# Patient Record
Sex: FEMALE | Race: WHITE | Employment: FULL TIME | URBAN - METROPOLITAN AREA
[De-identification: names, ages, dates, MRNs, and addresses within clinical notes are randomized per-mention and may not be internally consistent; named-entity substitution may affect disease eponyms.]

---

## 2017-12-01 PROBLEM — T50.911A MULTIPLE DRUG OVERDOSE: Status: ACTIVE | Noted: 2017-12-01

## 2017-12-02 PROBLEM — R40.1 OBTUNDATION: Status: ACTIVE | Noted: 2017-12-02

## 2017-12-02 PROBLEM — E16.2 HYPOGLYCEMIA: Status: ACTIVE | Noted: 2017-12-02

## 2017-12-02 PROBLEM — R57.9 SHOCK (HCC): Status: ACTIVE | Noted: 2017-12-02

## 2017-12-02 PROBLEM — E83.51 HYPOCALCEMIA: Status: ACTIVE | Noted: 2017-12-02

## 2017-12-02 PROBLEM — F12.90 MARIJUANA SMOKER: Chronic | Status: ACTIVE | Noted: 2017-12-02

## 2017-12-02 PROBLEM — E83.39 HYPOPHOSPHATEMIA: Status: ACTIVE | Noted: 2017-12-02

## 2017-12-02 PROBLEM — E87.20 LACTIC ACIDOSIS: Status: ACTIVE | Noted: 2017-12-02

## 2017-12-02 PROBLEM — R94.31 PROLONGED Q-T INTERVAL ON ECG: Status: ACTIVE | Noted: 2017-12-02

## 2017-12-02 PROBLEM — Z72.0 TOBACCO ABUSE: Chronic | Status: ACTIVE | Noted: 2017-12-02

## 2017-12-02 PROBLEM — Z87.890: Chronic | Status: ACTIVE | Noted: 2017-12-02

## 2017-12-02 PROBLEM — R94.31 T WAVE INVERSION IN EKG: Status: ACTIVE | Noted: 2017-12-02

## 2017-12-02 PROBLEM — E72.20 HYPERAMMONEMIA (HCC): Status: ACTIVE | Noted: 2017-12-02

## 2017-12-02 PROBLEM — T14.91XA SUICIDE ATTEMPT (HCC): Status: ACTIVE | Noted: 2017-12-02

## 2017-12-03 PROBLEM — E44.0 MODERATE PROTEIN-CALORIE MALNUTRITION (HCC): Status: ACTIVE | Noted: 2017-12-03

## 2017-12-05 ENCOUNTER — CASE MANAGEMENT (OUTPATIENT)
Dept: EMERGENCY DEPT | Age: 27
End: 2017-12-05

## 2017-12-06 PROBLEM — M62.82 NON-TRAUMATIC RHABDOMYOLYSIS: Status: ACTIVE | Noted: 2017-12-06

## 2017-12-07 PROBLEM — E83.51 HYPOCALCEMIA: Status: RESOLVED | Noted: 2017-12-02 | Resolved: 2017-12-07

## 2017-12-07 PROBLEM — E83.39 HYPOPHOSPHATEMIA: Status: RESOLVED | Noted: 2017-12-02 | Resolved: 2017-12-07

## 2017-12-07 PROBLEM — E16.2 HYPOGLYCEMIA: Status: RESOLVED | Noted: 2017-12-02 | Resolved: 2017-12-07

## 2017-12-07 PROBLEM — R57.9 SHOCK (HCC): Status: RESOLVED | Noted: 2017-12-02 | Resolved: 2017-12-07

## 2017-12-07 PROBLEM — R94.31 T WAVE INVERSION IN EKG: Status: RESOLVED | Noted: 2017-12-02 | Resolved: 2017-12-07

## 2017-12-07 PROBLEM — E87.20 LACTIC ACIDOSIS: Status: RESOLVED | Noted: 2017-12-02 | Resolved: 2017-12-07

## 2017-12-07 PROBLEM — F33.2 SEVERE EPISODE OF RECURRENT MAJOR DEPRESSIVE DISORDER, WITHOUT PSYCHOTIC FEATURES (HCC): Status: ACTIVE | Noted: 2017-12-07

## 2017-12-07 PROBLEM — M62.82 NON-TRAUMATIC RHABDOMYOLYSIS: Status: RESOLVED | Noted: 2017-12-06 | Resolved: 2017-12-07

## 2017-12-07 PROBLEM — E72.20 HYPERAMMONEMIA (HCC): Status: RESOLVED | Noted: 2017-12-02 | Resolved: 2017-12-07

## 2017-12-07 PROBLEM — T50.911A MULTIPLE DRUG OVERDOSE: Status: RESOLVED | Noted: 2017-12-01 | Resolved: 2017-12-07

## 2017-12-08 PROBLEM — R40.1 OBTUNDATION: Status: RESOLVED | Noted: 2017-12-02 | Resolved: 2017-12-08

## 2017-12-08 PROBLEM — F60.3 BORDERLINE PERSONALITY DISORDER (HCC): Chronic | Status: ACTIVE | Noted: 2017-12-08

## 2017-12-08 PROBLEM — T14.91XA SUICIDE ATTEMPT (HCC): Status: RESOLVED | Noted: 2017-12-02 | Resolved: 2017-12-08

## 2017-12-08 PROBLEM — Z72.0 TOBACCO ABUSE: Chronic | Status: RESOLVED | Noted: 2017-12-02 | Resolved: 2017-12-08

## 2017-12-08 PROBLEM — F33.2 SEVERE EPISODE OF RECURRENT MAJOR DEPRESSIVE DISORDER, WITHOUT PSYCHOTIC FEATURES (HCC): Status: RESOLVED | Noted: 2017-12-07 | Resolved: 2017-12-08

## 2017-12-08 PROBLEM — R94.31 PROLONGED Q-T INTERVAL ON ECG: Status: RESOLVED | Noted: 2017-12-02 | Resolved: 2017-12-08

## 2017-12-08 PROBLEM — E44.0 MODERATE PROTEIN-CALORIE MALNUTRITION (HCC): Status: RESOLVED | Noted: 2017-12-03 | Resolved: 2017-12-08

## 2017-12-14 ENCOUNTER — HOSPITAL ENCOUNTER (OUTPATIENT)
Dept: PSYCHIATRY | Age: 27
Discharge: HOME | End: 2017-12-31
Attending: PSYCHIATRY & NEUROLOGY | Admitting: PSYCHIATRY & NEUROLOGY

## 2017-12-14 PROCEDURE — 99232 SBSQ HOSP IP/OBS MODERATE 35: CPT | Performed by: PHYSICIAN ASSISTANT

## 2017-12-14 ASSESSMENT — ENCOUNTER SYMPTOMS
DOUBLE VISION: 0
DIARRHEA: 0
VOMITING: 0
ABDOMINAL PAIN: 0
NAUSEA: 0
BLURRED VISION: 0
CONSTIPATION: 0
SHORTNESS OF BREATH: 0

## 2017-12-14 NOTE — PLAN OF CARE
Problem: Altered Mood, Depressive Behavior  Goal: STG-Knowledge of positive coping patterns  Outcome: Ongoing                                                                      Group Therapy Note    Date: 12/14/2017  Start Time: 10:00am  End Time: 11:00am  Number of Participants: 8    Type of Group: Psychoeducation    Psychoeducation/ Recreation Therapy    Patient's Goal:  To use \"role play\" cards to practice assertiveness. Notes:  Pt engaged in group discussion. Pt was appropriate. Pt was able to increase assertiveness skills through this activity. Status After Intervention:  Improved    Participation Level:  Active Listener and Interactive    Participation Quality: Appropriate, Attentive, Sharing and Supportive      Speech:  normal      Thought Process/Content: Logical      Affective Functioning: Flat      Mood: depressed      Level of consciousness:  Alert and Oriented x4      Response to Learning: Able to retain information and Capable of insight      Endings: None Reported    Modes of Intervention: Education, Support, Socialization and Activity      Discipline Responsible: Psychoeducational Specialist      Signature:  Peter Leach

## 2017-12-14 NOTE — PROGRESS NOTES
Patient arrived for her intake appointment and first day of outpatient treatment. She states that she was discharged from the [facility] inpatient unit two days ago. She states that since discharge that she has been \" doing good\". She states that she was admitted to the inpatient unit do to \" a suicide attempt\". She states that she had been \"struggling since may with bipolar\". She states \" It was more of a psychosis thing , then a depressed thing\". She rates her depression level at a 4 out of ten. She states that she is glad to be back at her apartment. She denies SI/HI , AVH. She states \" I still have the intrusive thoughts that have like been slowly creeping back in\". She describes these thoughts as \" paranoid\" . \" like the world is out to get me type of thing\". She will begin PHP today 12/14/2017.

## 2017-12-15 ENCOUNTER — HOSPITAL ENCOUNTER (OUTPATIENT)
Dept: PSYCHIATRY | Age: 27
Discharge: HOME | End: 2017-12-15
Admitting: PSYCHIATRY & NEUROLOGY

## 2017-12-15 NOTE — BH NOTE
Group Therapy Note    Date: 12/15/2017  Start Time: 8:30  End Time:  9:45  Number of Participants: 8    Type of Group: Psychotherapy    Patient's Goal:  Pt will improve interpersonal interactions through group processing and agenda setting. Notes:  Pt participated in group discussion, provided supportive feedback toward others, and addressed her agenda within the group. Status After Intervention:  Unchanged    Participation Level:  Active Listener and Minimal    Participation Quality: Appropriate      Speech:  normal      Thought Process/Content: Logical      Affective Functioning: Flat      Mood: dysphoric      Level of consciousness:  Alert, Oriented x4 and Attentive      Response to Learning: Able to verbalize current knowledge/experience and Progressing to goal      Endings: None Reported    Modes of Intervention: Education, Support, Socialization, Exploration, Clarifying and Problem-solving      Discipline Responsible: /Counselor      Signature:  Kain Abebe

## 2017-12-15 NOTE — PLAN OF CARE
Problem: Altered Mood, Depressive Behavior  Goal: LTG-Able to verbalize acceptance of life and situations over which he or she has no control                                                                      Group Therapy Note    Date: 12/15/2017  Start Time: 11:15  End Time:  12:15  Number of Participants: 6    Type of Group: Relapse Prevention    Wellness Binder Information  Module Name: Reducing relapse  Session Number:  Tab 14    Patient's Goal:  Pt will identify common triggers and develop strategies to apply if triggered. Notes:  PT participated in group discussion, was able to relate to group topic, and remained engaged for the entire duration of group. Status After Intervention:  Unchanged    Participation Level:  Active Listener and Interactive    Participation Quality: Appropriate, Attentive, Sharing and Supportive      Speech:  normal      Thought Process/Content: Logical      Affective Functioning: Flat      Mood: dysphoric      Level of consciousness:  Alert, Oriented x4 and Attentive      Response to Learning: Able to verbalize current knowledge/experience and Progressing to goal      Endings: None Reported    Modes of Intervention: Education, Support, Socialization, Exploration, Clarifying and Problem-solving      Discipline Responsible: /Counselor      Signature:  Shanda Saint

## 2017-12-18 ENCOUNTER — HOSPITAL ENCOUNTER (OUTPATIENT)
Dept: PSYCHIATRY | Age: 27
Discharge: HOME | End: 2017-12-18
Admitting: PSYCHIATRY & NEUROLOGY

## 2017-12-18 VITALS — SYSTOLIC BLOOD PRESSURE: 102 MMHG | DIASTOLIC BLOOD PRESSURE: 66 MMHG | HEART RATE: 95 BPM

## 2017-12-18 NOTE — PLAN OF CARE
Problem: Altered Mood, Depressive Behavior  Goal: LTG-Able to verbalize acceptance of life and situations over which he or she has no control                                                                      Group Therapy Note    Date: 12/18/2017  Start Time: 8:30  End Time:  9:45  Number of Participants: 9    Type of Group: Psychotherapy      Patient's Goal:  Pt will improve interpersonal interactions through group processing and agenda setting. Notes:  Pt participated in group discussion, provided supportive feedback toward others, and addressed her agenda within the group. Status After Intervention:  Unchanged    Participation Level:  Active Listener and Minimal    Participation Quality: Appropriate, Attentive and Supportive      Speech:  normal      Thought Process/Content: Logical      Affective Functioning: Flat      Mood: dysphoric      Level of consciousness:  Alert, Oriented x4 and Attentive      Response to Learning: Able to verbalize current knowledge/experience and Progressing to goal      Endings: None Reported    Modes of Intervention: Education, Support, Socialization, Exploration, Clarifying and Problem-solving      Discipline Responsible: /Counselor      Signature:  Kush Burgess

## 2017-12-20 ENCOUNTER — HOSPITAL ENCOUNTER (OUTPATIENT)
Dept: PSYCHIATRY | Age: 27
Discharge: HOME | End: 2017-12-20
Admitting: PSYCHIATRY & NEUROLOGY

## 2017-12-20 VITALS — DIASTOLIC BLOOD PRESSURE: 65 MMHG | SYSTOLIC BLOOD PRESSURE: 98 MMHG | HEART RATE: 86 BPM

## 2017-12-20 NOTE — PLAN OF CARE
Problem: Altered Mood, Depressive Behavior  Goal: LTG-Able to verbalize acceptance of life and situations over which he or she has no control                                                                      Group Therapy Note    Date: 12/20/2017  Start Time: 8:30  End Time:  9:45  Number of Participants: 8    Type of Group: Psychotherapy    Patient's Goal:  Pt will improve interpersonal interactions through group processing agenda setting. Notes:  PT participated in group discussion, provided supportive feedback toward others, and addressed her agenda within the group. Status After Intervention:  Unchanged    Participation Level:  Active Listener and Interactive    Participation Quality: Appropriate, Attentive, Sharing and Supportive      Speech:  normal      Thought Process/Content: Logical      Affective Functioning: Flat      Mood: dysphoric      Level of consciousness:  Alert, Oriented x4 and Attentive      Response to Learning: Able to verbalize current knowledge/experience and Progressing to goal      Endings: None Reported    Modes of Intervention: Education, Support, Socialization, Exploration, Clarifying and Problem-solving      Discipline Responsible: /Counselor      Signature:  Rony German

## 2017-12-20 NOTE — BH NOTE
The patient is currently in [facility] and will step down to IOP treatment on December 28th. The patient continues to report VH. She does not appear to be responding to internal stimuli in groups. She is guarded but does participate in treatment.     Hossein Suarez PA-C

## 2017-12-20 NOTE — PLAN OF CARE
Problem: Altered Mood, Depressive Behavior  Goal: STG-Knowledge of positive coping patterns  Outcome: Ongoing                                                                      Group Therapy Note    Date: 12/20/2017  Start Time: 1:15 pm  End Time:  2:30 pm  Number of Participants: 5    Type of Group: Psychoeducation    Patient's Goal:  Patients were invited to participate in an open art studio, allowing patients to explore their current emotional state and giving them freedom of choice to determine their own art material and subject matter, encouraging independent decision making and problem solving. Patients were then invited to process their work with the group. Notes:  Santino actively engaged in art making, selecting to work with graphite pencils and paper to draw a detailed image of a girl with flowers in her hair. Santino did not engage in group conversation unless prompted and threw her work away at the end of session, selecting not to share with peers. Status After Intervention:  Unchanged    Participation Level:  Active Listener    Participation Quality: Appropriate and Attentive      Speech:  hesitant      Thought Process/Content: Logical      Affective Functioning: Flat      Mood: depressed      Level of consciousness:  Alert, Oriented x4 and Attentive      Response to Learning: Able to verbalize current knowledge/experience, Able to verbalize/acknowledge new learning, Able to retain information and Capable of insight      Endings: None Reported    Modes of Intervention: Education, Support, Socialization, Exploration and Problem-solving      Discipline Responsible: Psychoeducational Specialist      Signature:  Cuauhtemoc Marin

## 2017-12-21 ENCOUNTER — HOSPITAL ENCOUNTER (OUTPATIENT)
Dept: PSYCHIATRY | Age: 27
Discharge: HOME | End: 2017-12-21
Admitting: PSYCHIATRY & NEUROLOGY

## 2017-12-21 DIAGNOSIS — F60.3 BORDERLINE PERSONALITY DISORDER (HCC): Chronic | ICD-10-CM

## 2017-12-21 DIAGNOSIS — F33.9 MDD (MAJOR DEPRESSIVE DISORDER), RECURRENT EPISODE, WITH ATYPICAL FEATURES (HCC): Primary | Chronic | ICD-10-CM

## 2017-12-21 DIAGNOSIS — Z87.890 TRANSGENDER, S/P SEX REASSIGNMENT SURGERY: Chronic | ICD-10-CM

## 2017-12-21 PROCEDURE — 99232 SBSQ HOSP IP/OBS MODERATE 35: CPT | Performed by: PHYSICIAN ASSISTANT

## 2017-12-21 ASSESSMENT — ENCOUNTER SYMPTOMS
CONSTIPATION: 0
BLURRED VISION: 0
DOUBLE VISION: 0
DIARRHEA: 0
NAUSEA: 0
SHORTNESS OF BREATH: 0
VOMITING: 0

## 2017-12-21 NOTE — PROGRESS NOTES
ancillary staff notes. Evaluated medications assessed for side effects and effectiveness. Assessed patient's educational needs including reviewing Plan of Care, medications and diagnosis. Current Medications:  Current Outpatient Prescriptions   Medication Sig Dispense Refill    FLUoxetine (PROZAC) 20 MG capsule Take 1 capsule by mouth nightly 14 capsule 2    ARIPiprazole (ABILIFY) 5 MG tablet Take 1 tablet by mouth daily for 14 days 14 tablet 0    ARIPiprazole ER (ABILIFY MAINTENA) 300 MG SRER injection Inject 300 mg into the muscle once for 1 dose Next Dose Due 1/10/2018 300 mg 0    estradiol (ESTRACE) 2 MG tablet Take 2 mg by mouth 2 times daily       No current facility-administered medications for this encounter. Plan:   1. Schedule next Abilify Maintena injection for January 8th. 2. Continue with Prozac 20 mg daily and reassess in two weeks. 3. Step Down to IOP on 12/26/2017  5.  Follow-up as needed      Shani Hernandez PA-C  12/21/17

## 2017-12-21 NOTE — BH NOTE
Date: 12-21-17  Start time: 10:45 am   End time: 11:00 am     Therapist met with Pt and her mother to discuss her treatment. Pt shared that she does not feel she is getting much from the groups and would like to drop down to two days a week. Pt's mother has concerns that Pt is not doing better and is worried that she will kill herself if she is left alone. Pt said she will not kill herself. It was decided that Pt will attend Monday and Thursday. Pt will follow up with [address] [place] [address] when she completes IOP.

## 2017-12-21 NOTE — PLAN OF CARE
Problem: Altered Mood, Depressive Behavior  Goal: LTG-Able to verbalize and/or display a decrease in depressive symptoms  Outcome: Ongoing                                                                      Group Therapy Note    Date: 12/21/2017  Start Time: 8:30 am   End Time:  9:45 am   Number of Participants: 7    Type of Group: Psychotherapy    Patient's Goal:  Pt's goal was to give another group member feedback. Notes:  Pt was not engaged in group. Pt only gave feedback when asked to.        Status After Intervention:  Unchanged    Participation Level: Minimal    Participation Quality: Appropriate      Speech:  hesitant      Thought Process/Content: Logical      Affective Functioning: Blunted      Mood: depressed      Level of consciousness:  Preoccupied      Response to Learning: Able to verbalize current knowledge/experience      Endings: None Reported    Modes of Intervention: Education, Support, Socialization, Exploration, Clarifying, Problem-solving, Activity and Movement      Discipline Responsible: /Counselor      Signature:  Luz Mccartney, [facility]

## 2017-12-26 ENCOUNTER — HOSPITAL ENCOUNTER (OUTPATIENT)
Dept: PSYCHIATRY | Age: 27
Discharge: HOME | End: 2017-12-26
Admitting: PSYCHIATRY & NEUROLOGY

## 2017-12-26 PROCEDURE — 99214 OFFICE O/P EST MOD 30 MIN: CPT | Performed by: PHYSICIAN ASSISTANT

## 2017-12-26 RX ORDER — RISPERIDONE 0.5 MG/1
0.5 TABLET, FILM COATED ORAL NIGHTLY
Qty: 14 TABLET | Refills: 0 | Status: SHIPPED | OUTPATIENT
Start: 2017-12-26 | End: 2017-12-28

## 2017-12-26 ASSESSMENT — ENCOUNTER SYMPTOMS
VOMITING: 0
DIARRHEA: 0
DOUBLE VISION: 0
BLURRED VISION: 0
SHORTNESS OF BREATH: 0
CONSTIPATION: 0
NAUSEA: 0

## 2017-12-26 NOTE — PROGRESS NOTES
Yes  Attending groups: Yes    Social History     Social History    Marital status: Single     Spouse name: N/A    Number of children: N/A    Years of education: N/A     Social History Main Topics    Smoking status: Current Every Day Smoker     Packs/day: 0.50     Types: Cigarettes    Smokeless tobacco: Never Used    Alcohol use No    Drug use: Yes     Types: Marijuana    Sexual activity: Yes     Partners: Male     Other Topics Concern    Not on file     Social History Narrative    No narrative on file         Review of Systems   Constitutional: Negative for chills and weight loss. HENT: Negative for tinnitus. Eyes: Negative for blurred vision and double vision. Respiratory: Negative for shortness of breath. Cardiovascular: Negative for chest pain and palpitations. Gastrointestinal: Negative for constipation, diarrhea, nausea and vomiting. Skin: Negative for itching and rash. Neurological: Negative for dizziness, tingling, tremors, sensory change, speech change, seizures, weakness and headaches. Psychiatric/Behavioral: Positive for depression and hallucinations. Negative for suicidal ideas. The patient is nervous/anxious. Reviewed VS, Reviewed pertinent labs, No acute distress, Respirations: no distress noted, Neuro: No abnormalities evident    Objective: There were no vitals filed for this visit. No results found for this or any previous visit (from the past 336 hour(s)). Mental Status Exam    Appearance/Hygiene:  good grooming and good hygiene .  Poor eye contact  Motor Behavior: WNL   Gait: WNL  Attitude: cooperative  Affect: depressed affect   Speech: soft  Mood: depressed   Thought Processes: Unusual fears  Perceptions: Present - hearing nature and voices talking to her, messages are negative   Thought content: feeling overwhelmed by voices, concerned about medications   Suicidal ideation:  no specific plan to harm self   Homicidal ideation:  none  Cognition: Symptoms are not currently causing impairment   Orientation: A&Ox4   Memory: intact  Concentration: Poor    Insight: emerging  Judgement: mild impairment, not committing to treatment        Diagnoses:  1. MDD  2. Gender dysphoria MTF  3. Borderline Personality Disorder    Assessment and Plan:  Assessment: 28 y.o. female who was admitted to OP Program for treatment of mood. The patient is reporting increase in anxiety after taking Abilify PO. This would likely resolve in the next few days as she will no longer be taking PO Abilify and anxiety is much less likely to occur with IM Abilify. However, given the increase in AH and lack of response to Abilify, I will consider a change in medications. Patient will be restarted on Risperdal 0.5 mg nightly. I will consider and increase in Prozac at our next appointment if the patient continues to experience anxiety symptoms. Give her hx of severe OD I will prescribe in two week increments. Reviewed nursing and ancillary staff notes. Evaluated medications assessed for side effects and effectiveness. Assessed patient's educational needs including reviewing Plan of Care, medications and diagnosis. Current Medications:    Current Outpatient Prescriptions   Medication Sig Dispense Refill    risperiDONE (RISPERDAL) 0.5 MG tablet Take 1 tablet by mouth nightly for 14 days 14 tablet 0    FLUoxetine (PROZAC) 20 MG capsule Take 1 capsule by mouth nightly 14 capsule 2    estradiol (ESTRACE) 2 MG tablet Take 2 mg by mouth 2 times daily       No current facility-administered medications for this encounter. Plan:   1. Discontinue Abilify Injection. RN to cancel appointment for Jan 9th. 2. Begin Risperdal 0.5 mg nightly. 3. Continue IOP  4. Scripts given to the patient  5. Follow-up as needed.       Kathy Camejo PA-C  12/26/17

## 2017-12-26 NOTE — BH NOTE
Group Therapy Note    Date: 12/26/2017  Start Time: 8:45  End Time:  9:45  Number of Participants: 7    Type of Group: Psychotherapy      Patient's Goal:  Pt will improve interpersonal interactions through group process and agenda setting. Notes:  Pt participated actively in group discussion, offered supportive feedback to peers, and addressed her agenda with the group. Status After Intervention:  Improved    Participation Level:  Active Listener    Participation Quality: Attentive and Sharing      Speech:  normal      Thought Process/Content: Logical      Affective Functioning: Congruent      Mood: depressed      Level of consciousness:  Alert and Oriented x4      Response to Learning: Able to verbalize current knowledge/experience and Capable of insight      Endings: None Reported    Modes of Intervention: Support and Exploration      Discipline Responsible: /Counselor      Signature:  TYRON Linda

## 2017-12-26 NOTE — BH NOTE
Group Therapy Note    Date: 12/26/2017  Start Time: 1010   End Time:  0696  Number of Participants: 8    Type of Group: Relapse Prevention    Wellness Binder Information  Module Name:  Developing Relationships  Session Number:  Tab 10, Module 5    Patient's Goal:  Understand benefits of positive social support and Identify and practice strategies for connecting with people. Notes:  Pt remains quiet overall and engages primarily through eye contact and facial expressions. Pt does respond appropriately to direct questions. Group discussed the type of support they get from the peers in the treatment group, the importance of setting healthy boundaries within relationships, and ways to continue building social support after completion of treatment. Pt did express a desire for increased social interactions and was observed nodding her head in agreement with peers when they identified benefits of social support. Status After Intervention:  Improved    Participation Level:  Active Listener    Participation Quality: Attentive      Speech:  normal      Thought Process/Content: Logical      Affective Functioning: Congruent      Mood: depressed      Level of consciousness:  Alert      Response to Learning: Able to verbalize current knowledge/experience      Endings: None Reported    Modes of Intervention: Education, Support and Exploration      Discipline Responsible: /Counselor      Signature:  TYRON Sanchez

## 2017-12-28 ENCOUNTER — HOSPITAL ENCOUNTER (OUTPATIENT)
Dept: PSYCHIATRY | Age: 27
Discharge: HOME | End: 2017-12-28
Admitting: PSYCHIATRY & NEUROLOGY

## 2017-12-28 PROCEDURE — 99214 OFFICE O/P EST MOD 30 MIN: CPT | Performed by: PHYSICIAN ASSISTANT

## 2017-12-28 RX ORDER — RISPERIDONE 2 MG/1
2 TABLET, FILM COATED ORAL NIGHTLY
Qty: 14 TABLET | Refills: 0 | Status: SHIPPED | OUTPATIENT
Start: 2017-12-28 | End: 2018-01-11

## 2017-12-28 RX ORDER — FLUOXETINE HYDROCHLORIDE 40 MG/1
40 CAPSULE ORAL NIGHTLY
Qty: 1 CAPSULE | Refills: 0
Start: 2017-12-28 | End: 2018-01-11

## 2017-12-28 ASSESSMENT — ENCOUNTER SYMPTOMS
NAUSEA: 0
CONSTIPATION: 0
VOMITING: 0
SHORTNESS OF BREATH: 0
ABDOMINAL PAIN: 0
BLURRED VISION: 0
DOUBLE VISION: 0
DIARRHEA: 0

## 2017-12-28 NOTE — PROGRESS NOTES
[facility] PHP/IOP Follow-up Provider Note    Catarino Fernandes  [phone]      CC: AH    Context: Patient not responding well to Abilify recently switched to Risperdal  Location: mood, AH  Duration: 23 days. Severity on intake: severe  Associated Symptoms on Admission: intrusive thoughts, depressed mood, isolation, possible delusions, anxiety  Modifying Factors: MTF gender reassignment surgery 7/2016    SUBJECTIVE:    Patient is feeling worse. Two days ago we added risperdal 0.5 mg to her regimen and discontinued her future Abilify injection as she was experiencing increased restlessness and anxiety after taking this medication. She was also having AH at that time. Up to this point the patient has not shared much of what she is thinking or experiencing. Today she was more open and began describing her struggle to reconcile her suicide attempt. She feels that her soul is lost because she tried to kill herself. She describes feeling numb when she attempted suicide which surprised her because she had always enjoyed fantasizing about her death in the past. She states that she thinks she may actually be dead already and doesn't know exactly where this life is. This part of the conversation was delusional in nature and non-sensical at times. When challenged on these statements she would pull back and report that she thinks she is alive because she didn't die in the hospital but then go back to idea that maybe this is the afterlife. She denies having any SI or HI at this time. She appears profoundly depressed but states that she also has not been sleeping well in the last two days. She is very medication focused but a lot of her statements are related to introspection that has been over-intellectualized to the point that it no longer makes sense.  I directed her to share some of her concerns, such as being a psychopath because she sometimes doesn't have feelings for others, with her group members and therapist. She states that she has been sharing more today. Her medication was helping but over the last week and a half has stopped working. She is unable to identify any major changes at home that have caused this. She states that Risperdal has helped with these voices in the past and Prozac 40 mg was beneficial to her mood. She is wanting changes to her medications today. Suicidal ideation:  denies suicidal ideation. Patient does not have medication side effects. Sleeping adequately:  No: difficulty falling to sleep   Appetite adequate: No: poor appetite  Attending groups: Yes    Social History     Social History    Marital status: Single     Spouse name: N/A    Number of children: N/A    Years of education: N/A     Social History Main Topics    Smoking status: Current Every Day Smoker     Packs/day: 0.50     Types: Cigarettes    Smokeless tobacco: Never Used    Alcohol use No    Drug use: Yes     Types: Marijuana    Sexual activity: Yes     Partners: Male     Other Topics Concern    Not on file     Social History Narrative    No narrative on file         Review of Systems   Constitutional: Negative for chills and fever. HENT: Negative for tinnitus. Eyes: Negative for blurred vision and double vision. Respiratory: Negative for shortness of breath. Cardiovascular: Negative for chest pain and palpitations. Gastrointestinal: Negative for abdominal pain, constipation, diarrhea, nausea and vomiting. Skin: Negative for itching and rash. Neurological: Negative for dizziness, tingling, tremors, focal weakness, weakness and headaches. Psychiatric/Behavioral: Positive for depression and hallucinations. Negative for memory loss, substance abuse and suicidal ideas. The patient is nervous/anxious and has insomnia. Reviewed VS, Reviewed pertinent labs, No acute distress, Respirations: no distress noted, Neuro: No abnormalities evident    Objective: There were no vitals filed for this visit.     No results found

## 2017-12-28 NOTE — PLAN OF CARE
Problem: Altered Mood, Depressive Behavior  Goal: STG-Knowledge of positive coping patterns  Outcome: Ongoing                                                                      Group Therapy Note    Date: 12/28/2017  Start Time: 11:15 am  End Time:  12:15 pm  Number of Participants: 6    Type of Group: Relapse Prevention    Wellness Binder Information  Module Name:  [address] [place] [address]  Session Number:  5    Patient's Goal:  Patients were invited to participate in a discussion on how to process negative or harmful thinking with the use of thought challenging and grounding techniques. Patients were then asked to create a drawing or to use a rock and with collaged words to reflect how they could let harmful thinking go and practice their coping strategies. Notes:  Santino participated in group discussion on grounding techniques and engaged in art making, but selected not to share art work with peers. Santino processed with group she felt she was \"hurting people by being here. I think I might be possessed. \" Santino received positive feedback from peers and was informed that she was missed on the days she had been out from programming and her presence was appreciated. Status After Intervention:  Unchanged    Participation Level:  Active Listener    Participation Quality: Appropriate, Attentive, Sharing      Speech:  hesitant      Thought Process/Content: Perseverating      Affective Functioning: Flat      Mood: anxious and depressed      Level of consciousness:  Alert and Preoccupied      Response to Learning: Able to verbalize current knowledge/experience, Able to verbalize/acknowledge new learning, Able to retain information      Endings: None Reported    Modes of Intervention: Education, Support, Socialization and Exploration      Discipline Responsible: Psychoeducational Specialist      Signature:  Cuauhtemoc Banerjee

## 2018-01-01 ENCOUNTER — HOSPITAL ENCOUNTER (OUTPATIENT)
Dept: PSYCHIATRY | Age: 28
Discharge: HOME | End: 2018-01-31
Attending: PSYCHIATRY & NEUROLOGY | Admitting: PSYCHIATRY & NEUROLOGY

## 2018-01-02 ENCOUNTER — HOSPITAL ENCOUNTER (OUTPATIENT)
Dept: PSYCHIATRY | Age: 28
Discharge: HOME | End: 2018-01-02
Admitting: PSYCHIATRY & NEUROLOGY

## 2018-01-02 NOTE — PLAN OF CARE
Problem: Altered Mood, Depressive Behavior  Goal: STG-Knowledge of positive coping patterns  Outcome: Ongoing                                                                      Group Therapy Note    Date: 1/2/2018  Start Time: 11:15 am   End Time:  12:00 pm   Number of Participants: 6    Type of Group: Cognitive Skills    Wellness Binder Information  Module Name:  Tab 5 Mental Health Wellness   Session Number:  Turning Negative Thinkers into Positive Ones    Patient's Goal:  Pt's goal was to learn ways to turn negative thoughts into positive ones. Notes:  Pt participated in group discussion. Pt met goal.     Status After Intervention:  Unchanged    Participation Level:  Active Listener and Interactive    Participation Quality: Appropriate and Attentive      Speech:  hesitant      Thought Process/Content: Logical      Affective Functioning: Congruent      Mood: depressed      Level of consciousness:  Alert      Response to Learning: Able to verbalize current knowledge/experience, Able to verbalize/acknowledge new learning, Able to retain information and Progressing to goal      Endings: None Reported    Modes of Intervention: Education, Support, Socialization, Exploration, Clarifying, Problem-solving, Activity and Movement      Discipline Responsible: /Counselor      Signature:  Amador Martines 53

## 2018-01-04 ENCOUNTER — HOSPITAL ENCOUNTER (OUTPATIENT)
Dept: PSYCHIATRY | Age: 28
Discharge: HOME | End: 2018-01-04
Admitting: PSYCHIATRY & NEUROLOGY

## 2018-01-09 ENCOUNTER — HOSPITAL ENCOUNTER (OUTPATIENT)
Dept: PSYCHIATRY | Age: 28
Discharge: HOME | End: 2018-01-09
Admitting: PSYCHIATRY & NEUROLOGY

## 2018-01-09 NOTE — PLAN OF CARE
Problem: Altered Mood, Depressive Behavior  Goal: STG-Knowledge of positive coping patterns  Outcome: Ongoing                                                                      Group Therapy Note    Date: 1/9/2018  Start Time: 0845  End Time:  0945  Number of Participants: 5    Type of Group: Psychotherapy    Patient's Goal:  Pt will improve interpersonal interactions through group processing and agenda setting    Notes:  Pt participated in the group discussion and able to accept feedback from other group members. Met goal.    Status After Intervention:  Improved    Participation Level:  Active Listener and Interactive    Participation Quality: Appropriate, Attentive and Sharing      Speech:  normal      Thought Process/Content: Logical      Affective Functioning: Flat      Mood: depressed      Level of consciousness:  Alert, Oriented x4 and Attentive      Response to Learning: Able to verbalize current knowledge/experience and Able to verbalize/acknowledge new learning      Endings: None Reported    Modes of Intervention: Education, Support, Socialization, Exploration and Clarifying      Discipline Responsible: /Counselor      Signature:  JACQUELINE Gillette

## 2018-01-09 NOTE — BH NOTE
Date: 1-9-18    Start time: 11:00 am   End time: 11:05 am     Therapist met with Pt to discuss her discharge. Therapist asked when Pt's next appointment with [facility] is. Pt said she has to meet with her therapist at [facility] first and then she can set up an appointment with [facility]. Therapist encouraged Pt to do so. Pt said she would when she got home.

## 2018-01-11 ENCOUNTER — HOSPITAL ENCOUNTER (OUTPATIENT)
Dept: PSYCHIATRY | Age: 28
Discharge: HOME | End: 2018-01-11
Admitting: PSYCHIATRY & NEUROLOGY

## 2018-01-11 PROCEDURE — 99214 OFFICE O/P EST MOD 30 MIN: CPT | Performed by: PHYSICIAN ASSISTANT

## 2018-01-11 RX ORDER — FLUOXETINE HYDROCHLORIDE 40 MG/1
40 CAPSULE ORAL NIGHTLY
Qty: 30 CAPSULE | Refills: 0 | Status: SHIPPED | OUTPATIENT
Start: 2018-01-11 | End: 2019-02-04

## 2018-01-11 RX ORDER — RISPERIDONE 2 MG/1
2 TABLET, FILM COATED ORAL NIGHTLY
Qty: 30 TABLET | Refills: 0 | Status: ON HOLD | OUTPATIENT
Start: 2018-01-11 | End: 2019-02-08

## 2018-01-11 NOTE — PLAN OF CARE
Problem: Altered Mood, Depressive Behavior  Goal: STG-Knowledge of positive coping patterns  Outcome: Ongoing                                                                      Group Therapy Note    Date: 1/11/2018  Start Time: 1:15 pm  End Time:  2:15 pm  Number of Participants: 3    Type of Group: Psychoeducation    Patient's Goal:  Patients were encouraged to participate in an art therapy activity to create a self-portrait of themselves and were invited to use the metaphor of a tree, reflecting on their roots (core values), trunk (stability), branches (balance), leaves (self-expression and use of time), and the sun (nourishment). Notes: Catarino participated in reflective art activity, creating a self-portrait and selecting to work with markers to create a drawing of a woman: Kitty Segura is the embodiment of the music we were listening to. Efrain and peaceful. \" Catarino reflected that coming to group \"got me out of my comfort zone. I was able to force myself to come to group and to do something new. \" Catarino appeared receptive to positive feedback she received from therapist and peers and gave her artwork to a peer as a gift. Status After Intervention:  Improved    Participation Level:  Active Listener and Interactive    Participation Quality: Appropriate, Attentive and Sharing      Speech:  hesitant      Thought Process/Content: Linear      Affective Functioning: Constricted/Restricted      Mood: anxious      Level of consciousness:  Alert, Oriented x4 and Attentive      Response to Learning: Able to verbalize current knowledge/experience, Able to verbalize/acknowledge new learning, Able to retain information, Capable of insight and Able to change behavior      Endings: None Reported    Modes of Intervention: Education, Support, Socialization and Exploration      Discipline Responsible: Psychoeducational Specialist      Signature:  Cuauhtemoc Welch

## 2018-01-11 NOTE — PLAN OF CARE
Problem: Altered Mood, Depressive Behavior  Goal: STG-Knowledge of positive coping patterns  Outcome: Ongoing                                                                      Group Therapy Note    Date: 1/11/2018  Start Time: 10:00am  End Time: 11:00am  Number of Participants: 6    Type of Group: Psychoeducation    Psychoeducation/ Recreation Therapy     Patient's Goal: To discuss how to build a healthy support system. Notes: Pt engaged in group discussion. Pt was appropriate. Pt provided and was receptive of feedback to and from fellow group members.      Status After Intervention:  Improved    Participation Level: Minimal    Participation Quality: Appropriate, Attentive and Sharing      Speech:  normal      Thought Process/Content: Logical      Affective Functioning: Flat      Mood: anxious and depressed      Level of consciousness:  Alert and Oriented x4      Response to Learning: Able to retain information and Capable of insight      Endings: None Reported    Modes of Intervention: Education, Support, Socialization and Activity      Discipline Responsible: Psychoeducational Specialist      Signature:  Angie Thompson

## 2018-02-01 ENCOUNTER — HOSPITAL ENCOUNTER (OUTPATIENT)
Dept: PSYCHIATRY | Age: 28
Discharge: HOME | End: 2018-02-28
Attending: PSYCHIATRY & NEUROLOGY | Admitting: PSYCHIATRY & NEUROLOGY

## 2019-02-04 ENCOUNTER — HOSPITAL ENCOUNTER (INPATIENT)
Age: 29
LOS: 4 days | Discharge: HOME | DRG: 752 | End: 2019-02-08
Attending: EMERGENCY MEDICINE | Admitting: PSYCHIATRY & NEUROLOGY
Payer: COMMERCIAL

## 2019-02-04 DIAGNOSIS — F60.3 BORDERLINE PERSONALITY DISORDER (HCC): ICD-10-CM

## 2019-02-04 DIAGNOSIS — F32.9 MAJOR DEPRESSIVE DISORDER WITH CURRENT ACTIVE EPISODE, UNSPECIFIED DEPRESSION EPISODE SEVERITY, UNSPECIFIED WHETHER RECURRENT: ICD-10-CM

## 2019-02-04 DIAGNOSIS — F64.0 GENDER IDENTITY DISORDER IN ADULT: ICD-10-CM

## 2019-02-04 DIAGNOSIS — Z87.890 TRANSGENDER, S/P SEX REASSIGNMENT SURGERY: Chronic | ICD-10-CM

## 2019-02-04 DIAGNOSIS — F23 ACUTE PSYCHOSIS (HCC): Primary | ICD-10-CM

## 2019-02-04 DIAGNOSIS — F41.1 ANXIETY STATE: ICD-10-CM

## 2019-02-04 PROBLEM — F29 PSYCHOSIS (HCC): Status: ACTIVE | Noted: 2019-02-04

## 2019-02-04 LAB
A/G RATIO: 1.3 (ref 1.1–2.2)
ACETAMINOPHEN LEVEL: <5 UG/ML (ref 10–30)
ALBUMIN SERPL-MCNC: 5.1 G/DL (ref 3.4–5)
ALP BLD-CCNC: 91 U/L (ref 40–129)
ALT SERPL-CCNC: 17 U/L (ref 10–40)
AMPHETAMINE SCREEN, URINE: NORMAL
ANION GAP SERPL CALCULATED.3IONS-SCNC: 14 MMOL/L (ref 3–16)
AST SERPL-CCNC: 23 U/L (ref 15–37)
BACTERIA: ABNORMAL /HPF
BARBITURATE SCREEN URINE: NORMAL
BASOPHILS ABSOLUTE: 0 K/UL (ref 0–0.2)
BASOPHILS RELATIVE PERCENT: 0.4 %
BENZODIAZEPINE SCREEN, URINE: NORMAL
BILIRUB SERPL-MCNC: 0.4 MG/DL (ref 0–1)
BILIRUBIN URINE: NEGATIVE
BLOOD, URINE: NEGATIVE
BUN BLDV-MCNC: 10 MG/DL (ref 7–20)
CALCIUM SERPL-MCNC: 9.9 MG/DL (ref 8.3–10.6)
CANNABINOID SCREEN URINE: NORMAL
CHLORIDE BLD-SCNC: 97 MMOL/L (ref 99–110)
CLARITY: ABNORMAL
CO2: 27 MMOL/L (ref 21–32)
COCAINE METABOLITE SCREEN URINE: NORMAL
COLOR: YELLOW
CREAT SERPL-MCNC: 0.6 MG/DL (ref 0.6–1.1)
EOSINOPHILS ABSOLUTE: 0 K/UL (ref 0–0.6)
EOSINOPHILS RELATIVE PERCENT: 0.1 %
EPITHELIAL CELLS, UA: ABNORMAL /HPF
ETHANOL: NORMAL MG/DL (ref 0–0.08)
GFR AFRICAN AMERICAN: >60
GFR NON-AFRICAN AMERICAN: >60
GLOBULIN: 3.9 G/DL
GLUCOSE BLD-MCNC: 130 MG/DL (ref 70–99)
GLUCOSE URINE: NEGATIVE MG/DL
HCG(URINE) PREGNANCY TEST: NEGATIVE
HCT VFR BLD CALC: 40.4 % (ref 36–48)
HEMOGLOBIN: 13.7 G/DL (ref 12–16)
KETONES, URINE: NEGATIVE MG/DL
LEUKOCYTE ESTERASE, URINE: ABNORMAL
LYMPHOCYTES ABSOLUTE: 2.1 K/UL (ref 1–5.1)
LYMPHOCYTES RELATIVE PERCENT: 25.7 %
Lab: NORMAL
MCH RBC QN AUTO: 30.1 PG (ref 26–34)
MCHC RBC AUTO-ENTMCNC: 33.9 G/DL (ref 31–36)
MCV RBC AUTO: 88.7 FL (ref 80–100)
METHADONE SCREEN, URINE: NORMAL
MICROSCOPIC EXAMINATION: YES
MONOCYTES ABSOLUTE: 0.6 K/UL (ref 0–1.3)
MONOCYTES RELATIVE PERCENT: 7.8 %
MUCUS: ABNORMAL /LPF
NEUTROPHILS ABSOLUTE: 5.4 K/UL (ref 1.7–7.7)
NEUTROPHILS RELATIVE PERCENT: 66 %
NITRITE, URINE: NEGATIVE
OPIATE SCREEN URINE: NORMAL
OXYCODONE URINE: NORMAL
PDW BLD-RTO: 12.8 % (ref 12.4–15.4)
PH UA: 7.5
PH UA: 7.5
PHENCYCLIDINE SCREEN URINE: NORMAL
PLATELET # BLD: 366 K/UL (ref 135–450)
PMV BLD AUTO: 7.8 FL (ref 5–10.5)
POTASSIUM REFLEX MAGNESIUM: 3.8 MMOL/L (ref 3.5–5.1)
PROPOXYPHENE SCREEN: NORMAL
PROTEIN UA: NEGATIVE MG/DL
RBC # BLD: 4.56 M/UL (ref 4–5.2)
RBC UA: ABNORMAL /HPF (ref 0–2)
SALICYLATE, SERUM: <0.3 MG/DL (ref 15–30)
SODIUM BLD-SCNC: 138 MMOL/L (ref 136–145)
SPECIFIC GRAVITY UA: 1.01
TOTAL PROTEIN: 9 G/DL (ref 6.4–8.2)
URINE TYPE: ABNORMAL
UROBILINOGEN, URINE: 0.2 E.U./DL
WBC # BLD: 8.2 K/UL (ref 4–11)
WBC UA: ABNORMAL /HPF (ref 0–5)

## 2019-02-04 PROCEDURE — 96374 THER/PROPH/DIAG INJ IV PUSH: CPT

## 2019-02-04 PROCEDURE — 6360000002 HC RX W HCPCS: Performed by: PHYSICIAN ASSISTANT

## 2019-02-04 PROCEDURE — 80307 DRUG TEST PRSMV CHEM ANLYZR: CPT

## 2019-02-04 PROCEDURE — 99284 EMERGENCY DEPT VISIT MOD MDM: CPT

## 2019-02-04 PROCEDURE — 81001 URINALYSIS AUTO W/SCOPE: CPT

## 2019-02-04 PROCEDURE — 80053 COMPREHEN METABOLIC PANEL: CPT

## 2019-02-04 PROCEDURE — G0480 DRUG TEST DEF 1-7 CLASSES: HCPCS

## 2019-02-04 PROCEDURE — 85025 COMPLETE CBC W/AUTO DIFF WBC: CPT

## 2019-02-04 PROCEDURE — 84703 CHORIONIC GONADOTROPIN ASSAY: CPT

## 2019-02-04 PROCEDURE — 1240000000 HC EMOTIONAL WELLNESS R&B

## 2019-02-04 PROCEDURE — 99221 1ST HOSP IP/OBS SF/LOW 40: CPT | Performed by: PHYSICIAN ASSISTANT

## 2019-02-04 RX ORDER — NICOTINE 21 MG/24HR
1 PATCH, TRANSDERMAL 24 HOURS TRANSDERMAL DAILY PRN
Status: DISCONTINUED | OUTPATIENT
Start: 2019-02-04 | End: 2019-02-08 | Stop reason: HOSPADM

## 2019-02-04 RX ORDER — MAGNESIUM HYDROXIDE/ALUMINUM HYDROXICE/SIMETHICONE 120; 1200; 1200 MG/30ML; MG/30ML; MG/30ML
30 SUSPENSION ORAL EVERY 6 HOURS PRN
Status: DISCONTINUED | OUTPATIENT
Start: 2019-02-04 | End: 2019-02-08 | Stop reason: HOSPADM

## 2019-02-04 RX ORDER — LORAZEPAM 2 MG/ML
2 INJECTION INTRAMUSCULAR ONCE
Status: COMPLETED | OUTPATIENT
Start: 2019-02-04 | End: 2019-02-04

## 2019-02-04 RX ORDER — ESTRADIOL 1 MG/1
2 TABLET ORAL 2 TIMES DAILY
Status: DISCONTINUED | OUTPATIENT
Start: 2019-02-04 | End: 2019-02-07

## 2019-02-04 RX ORDER — TRAZODONE HYDROCHLORIDE 50 MG/1
50 TABLET ORAL NIGHTLY PRN
Status: DISCONTINUED | OUTPATIENT
Start: 2019-02-04 | End: 2019-02-08 | Stop reason: HOSPADM

## 2019-02-04 RX ORDER — OLANZAPINE 5 MG/1
5 TABLET ORAL 2 TIMES DAILY PRN
Status: DISCONTINUED | OUTPATIENT
Start: 2019-02-04 | End: 2019-02-06

## 2019-02-04 RX ORDER — DIMETHICONE, OXYBENZONE, AND PADIMATE O 2; 2.5; 6.6 G/100G; G/100G; G/100G
1 STICK TOPICAL PRN
Status: DISCONTINUED | OUTPATIENT
Start: 2019-02-04 | End: 2019-02-07 | Stop reason: SDUPTHER

## 2019-02-04 RX ORDER — HYDROXYZINE PAMOATE 50 MG/1
50 CAPSULE ORAL 3 TIMES DAILY PRN
Status: DISCONTINUED | OUTPATIENT
Start: 2019-02-04 | End: 2019-02-08 | Stop reason: HOSPADM

## 2019-02-04 RX ORDER — ACETAMINOPHEN 325 MG/1
650 TABLET ORAL EVERY 4 HOURS PRN
Status: DISCONTINUED | OUTPATIENT
Start: 2019-02-04 | End: 2019-02-08 | Stop reason: HOSPADM

## 2019-02-04 RX ADMIN — LORAZEPAM 2 MG: 2 INJECTION, SOLUTION INTRAMUSCULAR; INTRAVENOUS at 10:30

## 2019-02-04 ASSESSMENT — SLEEP AND FATIGUE QUESTIONNAIRES: DO YOU HAVE DIFFICULTY SLEEPING: YES

## 2019-02-05 PROBLEM — F12.90 MARIJUANA SMOKER: Chronic | Status: RESOLVED | Noted: 2017-12-02 | Resolved: 2019-02-05

## 2019-02-05 PROBLEM — F68.11: Status: ACTIVE | Noted: 2019-02-05

## 2019-02-05 PROBLEM — F23 ACUTE PSYCHOSIS (HCC): Status: RESOLVED | Noted: 2019-02-04 | Resolved: 2019-02-05

## 2019-02-05 PROCEDURE — 1240000000 HC EMOTIONAL WELLNESS R&B

## 2019-02-05 PROCEDURE — 90792 PSYCH DIAG EVAL W/MED SRVCS: CPT | Performed by: NURSE PRACTITIONER

## 2019-02-05 ASSESSMENT — LIFESTYLE VARIABLES: HISTORY_ALCOHOL_USE: YES

## 2019-02-06 PROCEDURE — 99233 SBSQ HOSP IP/OBS HIGH 50: CPT | Performed by: NURSE PRACTITIONER

## 2019-02-06 PROCEDURE — 2580000003 HC RX 258

## 2019-02-06 PROCEDURE — 1240000000 HC EMOTIONAL WELLNESS R&B

## 2019-02-06 PROCEDURE — 2500000003 HC RX 250 WO HCPCS: Performed by: PSYCHIATRY & NEUROLOGY

## 2019-02-06 PROCEDURE — 6370000000 HC RX 637 (ALT 250 FOR IP): Performed by: PSYCHIATRY & NEUROLOGY

## 2019-02-06 PROCEDURE — 6370000000 HC RX 637 (ALT 250 FOR IP): Performed by: NURSE PRACTITIONER

## 2019-02-06 RX ORDER — OLANZAPINE 5 MG/1
5 TABLET, ORALLY DISINTEGRATING ORAL 2 TIMES DAILY PRN
Status: DISCONTINUED | OUTPATIENT
Start: 2019-02-06 | End: 2019-02-08 | Stop reason: HOSPADM

## 2019-02-06 RX ORDER — RISPERIDONE 1 MG/1
1 TABLET, ORALLY DISINTEGRATING ORAL 2 TIMES DAILY
Status: DISCONTINUED | OUTPATIENT
Start: 2019-02-07 | End: 2019-02-08 | Stop reason: HOSPADM

## 2019-02-06 RX ORDER — RISPERIDONE 1 MG/1
1 TABLET, ORALLY DISINTEGRATING ORAL 2 TIMES DAILY
Status: DISCONTINUED | OUTPATIENT
Start: 2019-02-06 | End: 2019-02-06

## 2019-02-06 RX ORDER — OLANZAPINE 10 MG/1
5 INJECTION, POWDER, LYOPHILIZED, FOR SOLUTION INTRAMUSCULAR
Status: COMPLETED | OUTPATIENT
Start: 2019-02-06 | End: 2019-02-06

## 2019-02-06 RX ADMIN — RISPERIDONE 1 MG: 1 TABLET, ORALLY DISINTEGRATING ORAL at 17:23

## 2019-02-06 RX ADMIN — WATER 20 ML: 1 INJECTION INTRAMUSCULAR; INTRAVENOUS; SUBCUTANEOUS at 09:00

## 2019-02-06 RX ADMIN — RISPERIDONE 1 MG: 1 TABLET, ORALLY DISINTEGRATING ORAL at 20:05

## 2019-02-06 RX ADMIN — OLANZAPINE 5 MG: 10 INJECTION, POWDER, FOR SOLUTION INTRAMUSCULAR at 09:05

## 2019-02-07 PROCEDURE — 6370000000 HC RX 637 (ALT 250 FOR IP): Performed by: NURSE PRACTITIONER

## 2019-02-07 PROCEDURE — 1240000000 HC EMOTIONAL WELLNESS R&B

## 2019-02-07 PROCEDURE — 99233 SBSQ HOSP IP/OBS HIGH 50: CPT | Performed by: NURSE PRACTITIONER

## 2019-02-07 PROCEDURE — 6370000000 HC RX 637 (ALT 250 FOR IP): Performed by: PSYCHIATRY & NEUROLOGY

## 2019-02-07 RX ORDER — DIMETHICONE, OXYBENZONE, AND PADIMATE O 2; 2.5; 6.6 G/100G; G/100G; G/100G
STICK TOPICAL PRN
Status: DISCONTINUED | OUTPATIENT
Start: 2019-02-07 | End: 2019-02-08 | Stop reason: HOSPADM

## 2019-02-07 RX ADMIN — RISPERIDONE 1 MG: 1 TABLET, ORALLY DISINTEGRATING ORAL at 21:49

## 2019-02-07 RX ADMIN — RISPERIDONE 1 MG: 1 TABLET, ORALLY DISINTEGRATING ORAL at 08:44

## 2019-02-07 RX ADMIN — Medication: at 14:54

## 2019-02-08 VITALS
WEIGHT: 92 LBS | SYSTOLIC BLOOD PRESSURE: 119 MMHG | TEMPERATURE: 98.1 F | HEART RATE: 125 BPM | DIASTOLIC BLOOD PRESSURE: 68 MMHG | BODY MASS INDEX: 15.33 KG/M2 | HEIGHT: 65 IN | OXYGEN SATURATION: 98 % | RESPIRATION RATE: 20 BRPM

## 2019-02-08 PROCEDURE — 5130000000 HC BRIDGE APPOINTMENT

## 2019-02-08 PROCEDURE — 6370000000 HC RX 637 (ALT 250 FOR IP): Performed by: PSYCHIATRY & NEUROLOGY

## 2019-02-08 PROCEDURE — 99239 HOSP IP/OBS DSCHRG MGMT >30: CPT | Performed by: NURSE PRACTITIONER

## 2019-02-08 RX ORDER — RISPERIDONE 1 MG/1
1 TABLET, FILM COATED ORAL 2 TIMES DAILY
Qty: 60 TABLET | Refills: 0 | Status: SHIPPED | OUTPATIENT
Start: 2019-02-08 | End: 2019-03-10

## 2019-02-08 RX ADMIN — RISPERIDONE 1 MG: 1 TABLET, ORALLY DISINTEGRATING ORAL at 09:22

## 2024-02-12 ENCOUNTER — TELEMEDICINE (OUTPATIENT)
Dept: UROLOGY | Facility: CLINIC | Age: 34
End: 2024-02-12
Payer: COMMERCIAL

## 2024-02-12 DIAGNOSIS — F64.9 GENDER DYSPHORIA: ICD-10-CM

## 2024-02-12 DIAGNOSIS — N76.0 PYOCOLPOS: Primary | ICD-10-CM

## 2024-02-12 DIAGNOSIS — N89.5 VAGINAL STENOSIS: ICD-10-CM

## 2024-02-12 PROCEDURE — 99204 OFFICE O/P NEW MOD 45 MIN: CPT | Performed by: UROLOGY

## 2024-02-12 RX ORDER — GABAPENTIN 600 MG/1
600 TABLET ORAL ONCE
Status: CANCELLED | OUTPATIENT
Start: 2024-02-12 | End: 2024-02-12

## 2024-02-12 RX ORDER — SODIUM CHLORIDE 9 MG/ML
100 INJECTION, SOLUTION INTRAVENOUS CONTINUOUS
Status: CANCELLED | OUTPATIENT
Start: 2024-02-12

## 2024-02-12 RX ORDER — ACETAMINOPHEN 325 MG/1
975 TABLET ORAL ONCE
Status: CANCELLED | OUTPATIENT
Start: 2024-02-12 | End: 2024-02-12

## 2024-02-12 RX ORDER — CELECOXIB 400 MG/1
400 CAPSULE ORAL ONCE
Status: CANCELLED | OUTPATIENT
Start: 2024-02-12 | End: 2024-02-12

## 2024-02-12 NOTE — PROGRESS NOTES
"Subjective   Patient ID: Blade Wang \"Diego\" is a 34 y.o. female who presents for pyocolpos, vaginal stenosis, and gender dysphoria.    HPI  The patient currently lives in [place] and is unemployed. She was previously working at [employer] in customer service. She previously had a penile inversion vaginoplasty done and has been experiencing post-op complications. She reports vaginal stenosis, pyocolpos, pus, and urinary problems. She has normal sexual sensation. She has requested a vaginectomy. She considered a vaginal revision but would prefer a vaginectomy as she felt as she didn't want a vaginal canal in the first place.    PIV in 2016 (SIGIFREDO Rabago)    Review of Systems  Not discussed    Objective   Physical Exam  Virtual visit    Assessment/Plan   Discussed possible vaginal revision but patient has requested a vaginectomy. She will need an MRI of pelvis and physical exam in office prior to surgery.    Vagincetomy will involve getting access to the canal and removing all epithelial lining, and performing colpocleisis         Scribe Attestation  By signing my name below, I, Casey Rider, Juan Joséibkathleen   attest that this documentation has been prepared under the direction and in the presence of Jorge A Quinteros MD.    "

## 2024-02-14 DIAGNOSIS — Z01.818 PREOPERATIVE CLEARANCE: ICD-10-CM

## 2024-02-23 ENCOUNTER — APPOINTMENT (OUTPATIENT)
Dept: RADIOLOGY | Facility: HOSPITAL | Age: 34
End: 2024-02-23
Payer: COMMERCIAL

## 2024-03-22 PROBLEM — F64.9 GENDER DYSPHORIA: Status: ACTIVE | Noted: 2024-03-22

## 2024-03-22 RX ORDER — KETOROLAC TROMETHAMINE 30 MG/ML
7.5 INJECTION, SOLUTION INTRAMUSCULAR; INTRAVENOUS EVERY 6 HOURS
Status: CANCELLED | OUTPATIENT
Start: 2024-03-22 | End: 2024-03-25

## 2024-03-22 RX ORDER — AMOXICILLIN 250 MG
2 CAPSULE ORAL 2 TIMES DAILY
Status: CANCELLED | OUTPATIENT
Start: 2024-03-22

## 2024-03-22 RX ORDER — SODIUM CHLORIDE 9 MG/ML
100 INJECTION, SOLUTION INTRAVENOUS CONTINUOUS
Status: CANCELLED | OUTPATIENT
Start: 2024-03-22

## 2024-03-22 RX ORDER — POLYETHYLENE GLYCOL 3350 17 G/17G
17 POWDER, FOR SOLUTION ORAL DAILY
Status: CANCELLED | OUTPATIENT
Start: 2024-03-22

## 2024-03-22 RX ORDER — ACETAMINOPHEN 325 MG/1
975 TABLET ORAL EVERY 6 HOURS
Status: CANCELLED | OUTPATIENT
Start: 2024-03-22

## 2024-03-22 RX ORDER — OXYCODONE HYDROCHLORIDE 5 MG/1
10 TABLET ORAL EVERY 4 HOURS PRN
Status: CANCELLED | OUTPATIENT
Start: 2024-03-22

## 2024-03-22 RX ORDER — ONDANSETRON HYDROCHLORIDE 2 MG/ML
4 INJECTION, SOLUTION INTRAVENOUS EVERY 6 HOURS PRN
Status: CANCELLED | OUTPATIENT
Start: 2024-03-22

## 2024-03-22 RX ORDER — ONDANSETRON 4 MG/1
4 TABLET, FILM COATED ORAL EVERY 6 HOURS PRN
Status: CANCELLED | OUTPATIENT
Start: 2024-03-22

## 2024-03-22 RX ORDER — CEFAZOLIN SODIUM 1 G/50ML
1 SOLUTION INTRAVENOUS EVERY 8 HOURS
Status: CANCELLED | OUTPATIENT
Start: 2024-03-22 | End: 2024-03-27

## 2024-03-22 RX ORDER — OXYCODONE HYDROCHLORIDE 5 MG/1
5 TABLET ORAL EVERY 4 HOURS PRN
Status: CANCELLED | OUTPATIENT
Start: 2024-03-22

## 2024-03-22 RX ORDER — ENOXAPARIN SODIUM 100 MG/ML
40 INJECTION SUBCUTANEOUS EVERY 24 HOURS
Status: CANCELLED | OUTPATIENT
Start: 2024-03-22

## 2024-03-25 ENCOUNTER — OFFICE VISIT (OUTPATIENT)
Dept: UROLOGY | Facility: CLINIC | Age: 34
End: 2024-03-25
Payer: COMMERCIAL

## 2024-03-25 VITALS
BODY MASS INDEX: 15.03 KG/M2 | HEIGHT: 67 IN | HEART RATE: 97 BPM | DIASTOLIC BLOOD PRESSURE: 74 MMHG | WEIGHT: 95.8 LBS | TEMPERATURE: 96.1 F | SYSTOLIC BLOOD PRESSURE: 113 MMHG

## 2024-03-25 DIAGNOSIS — F64.9 GENDER DYSPHORIA: ICD-10-CM

## 2024-03-25 PROCEDURE — 99214 OFFICE O/P EST MOD 30 MIN: CPT | Performed by: UROLOGY

## 2024-03-25 RX ORDER — ESTRADIOL 0.05 MG/D
1 FILM, EXTENDED RELEASE TRANSDERMAL
COMMUNITY
Start: 2024-03-25

## 2024-03-25 RX ORDER — CHOLECALCIFEROL (VITAMIN D3) 50 MCG
50 TABLET ORAL DAILY
COMMUNITY
Start: 2023-03-09

## 2024-03-25 RX ORDER — BIOTIN 10 MG
TABLET ORAL
COMMUNITY

## 2024-03-25 RX ORDER — ESTRADIOL 0.1 MG/D
1 FILM, EXTENDED RELEASE TRANSDERMAL
COMMUNITY
Start: 2020-04-27 | End: 2024-06-18

## 2024-03-25 NOTE — PROGRESS NOTES
"Subjective   Patient ID: Blade Wang \"Diego\" is a 34 y.o. female who presents for No chief complaint on file.    HPI  The patient presents in office for a pre-operative exam for vaginectomy.     She had a vaginoplasty done by Dr. Velasquez in 2016 that caused complications.    HPI (02/12/24)  The patient currently lives in [place] and is unemployed. She was previously working at [employer] in customer service. She previously had a penile inversion vaginoplasty done and has been experiencing post-op complications. She reports vaginal stenosis, pyocolpos, pus, and urinary problems. She has normal sexual sensation. She has requested a vaginectomy. She considered a vaginal revision but would prefer a vaginectomy as she felt as she didn't want a vaginal canal in the first place.     PIV in 2016 (SIGIFREDO Rabago)    Review of Systems  A 12 system review was completed and is negative with the exception of those signs and symptoms noted in the history of present illness.     Objective   Physical Exam  Constitutional: Patient appears well-developed and well-nourished. No acute distress.     Pulmonary/Chest: Effort normal. No respiratory distress.   : normal external genitalia, small dimple posterior to urethra, there is no depth to palpate with finger at this time  Integumentary: No rash or lesions.  Psychiatric: Normal mood and affect. Behavior is normal. Thought content normal.      Assessment/Plan   Removal of vaginal mucosa to the extent that we can, she may stay overnight or leave the same day after this procedure.     The patient was informed a robotic assisted approach may be needed if we are unable to remove entirety of vaginal mucosa.    Scribe Attestation  By signing my name below, I, Daniel Avalos   attest that this documentation has been prepared under the direction and in the presence of Jorge A Quinteros MD.    "

## 2024-03-26 ENCOUNTER — HOSPITAL ENCOUNTER (OUTPATIENT)
Dept: RADIOLOGY | Facility: EXTERNAL LOCATION | Age: 34
Discharge: HOME | End: 2024-03-26

## 2024-03-26 ENCOUNTER — APPOINTMENT (OUTPATIENT)
Dept: UROLOGY | Facility: CLINIC | Age: 34
End: 2024-03-26
Payer: COMMERCIAL

## 2024-03-26 DIAGNOSIS — Z41.9 SURGERY, ELECTIVE: ICD-10-CM

## 2024-03-27 RX ORDER — FLUOXETINE HYDROCHLORIDE 20 MG/1
20 CAPSULE ORAL DAILY
COMMUNITY
Start: 2024-03-15

## 2024-03-28 ENCOUNTER — HOSPITAL ENCOUNTER (INPATIENT)
Facility: HOSPITAL | Age: 34
LOS: 1 days | Discharge: HOME | End: 2024-03-28
Attending: UROLOGY | Admitting: UROLOGY
Payer: COMMERCIAL

## 2024-03-28 ENCOUNTER — ANESTHESIA EVENT (OUTPATIENT)
Dept: OPERATING ROOM | Facility: HOSPITAL | Age: 34
End: 2024-03-28
Payer: COMMERCIAL

## 2024-03-28 ENCOUNTER — ANESTHESIA (OUTPATIENT)
Dept: OPERATING ROOM | Facility: HOSPITAL | Age: 34
End: 2024-03-28
Payer: COMMERCIAL

## 2024-03-28 VITALS
DIASTOLIC BLOOD PRESSURE: 86 MMHG | HEART RATE: 82 BPM | SYSTOLIC BLOOD PRESSURE: 110 MMHG | RESPIRATION RATE: 18 BRPM | TEMPERATURE: 96.8 F | OXYGEN SATURATION: 100 %

## 2024-03-28 DIAGNOSIS — G89.18 ACUTE POSTOPERATIVE PAIN: ICD-10-CM

## 2024-03-28 DIAGNOSIS — N89.5 VAGINAL STENOSIS: ICD-10-CM

## 2024-03-28 DIAGNOSIS — N76.0 PYOCOLPOS: Primary | ICD-10-CM

## 2024-03-28 DIAGNOSIS — F64.9 GENDER DYSPHORIA: ICD-10-CM

## 2024-03-28 PROCEDURE — 1210000001 HC SEMI-PRIVATE ROOM DAILY

## 2024-03-28 PROCEDURE — 2500000004 HC RX 250 GENERAL PHARMACY W/ HCPCS (ALT 636 FOR OP/ED): Performed by: NURSE ANESTHETIST, CERTIFIED REGISTERED

## 2024-03-28 PROCEDURE — 3600000008 HC OR TIME - EACH INCREMENTAL 1 MINUTE - PROCEDURE LEVEL THREE: Performed by: UROLOGY

## 2024-03-28 PROCEDURE — 57110 VAGNC COMPL RMVL VAG WALL: CPT | Performed by: UROLOGY

## 2024-03-28 PROCEDURE — 3700000001 HC GENERAL ANESTHESIA TIME - INITIAL BASE CHARGE: Performed by: UROLOGY

## 2024-03-28 PROCEDURE — A57110 PR COMPLETE REMOVAL OF VAGINA WALL: Performed by: ANESTHESIOLOGY

## 2024-03-28 PROCEDURE — 2500000001 HC RX 250 WO HCPCS SELF ADMINISTERED DRUGS (ALT 637 FOR MEDICARE OP): Performed by: UROLOGY

## 2024-03-28 PROCEDURE — 7100000009 HC PHASE TWO TIME - INITIAL BASE CHARGE: Performed by: UROLOGY

## 2024-03-28 PROCEDURE — 2500000004 HC RX 250 GENERAL PHARMACY W/ HCPCS (ALT 636 FOR OP/ED)

## 2024-03-28 PROCEDURE — 88305 TISSUE EXAM BY PATHOLOGIST: CPT | Mod: TC,PARLAB | Performed by: UROLOGY

## 2024-03-28 PROCEDURE — 7100000010 HC PHASE TWO TIME - EACH INCREMENTAL 1 MINUTE: Performed by: UROLOGY

## 2024-03-28 PROCEDURE — 3600000003 HC OR TIME - INITIAL BASE CHARGE - PROCEDURE LEVEL THREE: Performed by: UROLOGY

## 2024-03-28 PROCEDURE — 2500000005 HC RX 250 GENERAL PHARMACY W/O HCPCS: Performed by: UROLOGY

## 2024-03-28 PROCEDURE — 2500000004 HC RX 250 GENERAL PHARMACY W/ HCPCS (ALT 636 FOR OP/ED): Performed by: UROLOGY

## 2024-03-28 PROCEDURE — 0UTG7ZZ RESECTION OF VAGINA, VIA NATURAL OR ARTIFICIAL OPENING: ICD-10-PCS | Performed by: UROLOGY

## 2024-03-28 PROCEDURE — 3700000002 HC GENERAL ANESTHESIA TIME - EACH INCREMENTAL 1 MINUTE: Performed by: UROLOGY

## 2024-03-28 PROCEDURE — 2500000004 HC RX 250 GENERAL PHARMACY W/ HCPCS (ALT 636 FOR OP/ED): Performed by: NURSE PRACTITIONER

## 2024-03-28 PROCEDURE — 0JXB0ZC TRANSFER PERINEUM SUBCUTANEOUS TISSUE AND FASCIA WITH SKIN, SUBCUTANEOUS TISSUE AND FASCIA, OPEN APPROACH: ICD-10-PCS | Performed by: UROLOGY

## 2024-03-28 PROCEDURE — 2500000005 HC RX 250 GENERAL PHARMACY W/O HCPCS: Performed by: NURSE ANESTHETIST, CERTIFIED REGISTERED

## 2024-03-28 PROCEDURE — 7100000001 HC RECOVERY ROOM TIME - INITIAL BASE CHARGE: Performed by: UROLOGY

## 2024-03-28 PROCEDURE — 2720000007 HC OR 272 NO HCPCS: Performed by: UROLOGY

## 2024-03-28 PROCEDURE — 14301 TIS TRNFR ANY 30.1-60 SQ CM: CPT | Performed by: UROLOGY

## 2024-03-28 PROCEDURE — 7100000002 HC RECOVERY ROOM TIME - EACH INCREMENTAL 1 MINUTE: Performed by: UROLOGY

## 2024-03-28 PROCEDURE — A57110 PR COMPLETE REMOVAL OF VAGINA WALL: Performed by: NURSE ANESTHETIST, CERTIFIED REGISTERED

## 2024-03-28 PROCEDURE — 88305 TISSUE EXAM BY PATHOLOGIST: CPT | Performed by: PATHOLOGY

## 2024-03-28 RX ORDER — ACETAMINOPHEN 325 MG/1
650 TABLET ORAL EVERY 4 HOURS PRN
Status: DISCONTINUED | OUTPATIENT
Start: 2024-03-28 | End: 2024-03-28 | Stop reason: HOSPADM

## 2024-03-28 RX ORDER — CELECOXIB 100 MG/1
400 CAPSULE ORAL ONCE
Status: COMPLETED | OUTPATIENT
Start: 2024-03-28 | End: 2024-03-28

## 2024-03-28 RX ORDER — OXYCODONE HYDROCHLORIDE 5 MG/1
5 TABLET ORAL EVERY 6 HOURS PRN
Qty: 12 TABLET | Refills: 0 | Status: SHIPPED | OUTPATIENT
Start: 2024-03-28 | End: 2024-03-31

## 2024-03-28 RX ORDER — LIDOCAINE HYDROCHLORIDE AND EPINEPHRINE 10; 10 MG/ML; UG/ML
INJECTION, SOLUTION INFILTRATION; PERINEURAL AS NEEDED
Status: DISCONTINUED | OUTPATIENT
Start: 2024-03-28 | End: 2024-03-28 | Stop reason: HOSPADM

## 2024-03-28 RX ORDER — ONDANSETRON HYDROCHLORIDE 2 MG/ML
INJECTION, SOLUTION INTRAVENOUS AS NEEDED
Status: DISCONTINUED | OUTPATIENT
Start: 2024-03-28 | End: 2024-03-28

## 2024-03-28 RX ORDER — ALBUTEROL SULFATE 0.83 MG/ML
2.5 SOLUTION RESPIRATORY (INHALATION) ONCE AS NEEDED
Status: DISCONTINUED | OUTPATIENT
Start: 2024-03-28 | End: 2024-03-28 | Stop reason: HOSPADM

## 2024-03-28 RX ORDER — SULFAMETHOXAZOLE AND TRIMETHOPRIM 800; 160 MG/1; MG/1
1 TABLET ORAL 2 TIMES DAILY
Qty: 10 TABLET | Refills: 0 | Status: SHIPPED | OUTPATIENT
Start: 2024-03-28 | End: 2024-03-28 | Stop reason: SDUPTHER

## 2024-03-28 RX ORDER — POLYETHYLENE GLYCOL 3350 17 G/17G
17 POWDER, FOR SOLUTION ORAL DAILY
Qty: 30 PACKET | Refills: 0 | Status: SHIPPED | OUTPATIENT
Start: 2024-03-28 | End: 2024-03-28 | Stop reason: SDUPTHER

## 2024-03-28 RX ORDER — OXYCODONE HYDROCHLORIDE 5 MG/1
5 TABLET ORAL EVERY 6 HOURS PRN
Qty: 4 TABLET | Refills: 0 | Status: SHIPPED | OUTPATIENT
Start: 2024-03-28 | End: 2024-03-28 | Stop reason: SDUPTHER

## 2024-03-28 RX ORDER — GABAPENTIN 300 MG/1
600 CAPSULE ORAL ONCE
Status: COMPLETED | OUTPATIENT
Start: 2024-03-28 | End: 2024-03-28

## 2024-03-28 RX ORDER — MEPERIDINE HYDROCHLORIDE 25 MG/ML
12.5 INJECTION INTRAMUSCULAR; INTRAVENOUS; SUBCUTANEOUS EVERY 10 MIN PRN
Status: DISCONTINUED | OUTPATIENT
Start: 2024-03-28 | End: 2024-03-28 | Stop reason: HOSPADM

## 2024-03-28 RX ORDER — SULFAMETHOXAZOLE AND TRIMETHOPRIM 800; 160 MG/1; MG/1
1 TABLET ORAL 2 TIMES DAILY
Qty: 10 TABLET | Refills: 0 | Status: SHIPPED | OUTPATIENT
Start: 2024-03-28 | End: 2024-04-02

## 2024-03-28 RX ORDER — ONDANSETRON HYDROCHLORIDE 2 MG/ML
4 INJECTION, SOLUTION INTRAVENOUS ONCE AS NEEDED
Status: DISCONTINUED | OUTPATIENT
Start: 2024-03-28 | End: 2024-03-28 | Stop reason: HOSPADM

## 2024-03-28 RX ORDER — FENTANYL CITRATE 50 UG/ML
INJECTION, SOLUTION INTRAMUSCULAR; INTRAVENOUS AS NEEDED
Status: DISCONTINUED | OUTPATIENT
Start: 2024-03-28 | End: 2024-03-28

## 2024-03-28 RX ORDER — PROPOFOL 10 MG/ML
INJECTION, EMULSION INTRAVENOUS AS NEEDED
Status: DISCONTINUED | OUTPATIENT
Start: 2024-03-28 | End: 2024-03-28

## 2024-03-28 RX ORDER — METRONIDAZOLE 500 MG/100ML
500 INJECTION, SOLUTION INTRAVENOUS ONCE
Status: COMPLETED | OUTPATIENT
Start: 2024-03-28 | End: 2024-03-28

## 2024-03-28 RX ORDER — GENTAMICIN 40 MG/ML
INJECTION, SOLUTION INTRAMUSCULAR; INTRAVENOUS AS NEEDED
Status: DISCONTINUED | OUTPATIENT
Start: 2024-03-28 | End: 2024-03-28

## 2024-03-28 RX ORDER — SCOLOPAMINE TRANSDERMAL SYSTEM 1 MG/1
PATCH, EXTENDED RELEASE TRANSDERMAL
Status: DISCONTINUED
Start: 2024-03-28 | End: 2024-03-28 | Stop reason: HOSPADM

## 2024-03-28 RX ORDER — SODIUM CHLORIDE 9 MG/ML
100 INJECTION, SOLUTION INTRAVENOUS CONTINUOUS
Status: DISCONTINUED | OUTPATIENT
Start: 2024-03-28 | End: 2024-03-28 | Stop reason: HOSPADM

## 2024-03-28 RX ORDER — CEFAZOLIN SODIUM 1 G/50ML
1 SOLUTION INTRAVENOUS ONCE
Status: COMPLETED | OUTPATIENT
Start: 2024-03-28 | End: 2024-03-28

## 2024-03-28 RX ORDER — LIDOCAINE HYDROCHLORIDE 20 MG/ML
INJECTION, SOLUTION EPIDURAL; INFILTRATION; INTRACAUDAL; PERINEURAL AS NEEDED
Status: DISCONTINUED | OUTPATIENT
Start: 2024-03-28 | End: 2024-03-28

## 2024-03-28 RX ORDER — SODIUM CHLORIDE, SODIUM LACTATE, POTASSIUM CHLORIDE, CALCIUM CHLORIDE 600; 310; 30; 20 MG/100ML; MG/100ML; MG/100ML; MG/100ML
100 INJECTION, SOLUTION INTRAVENOUS CONTINUOUS
Status: DISCONTINUED | OUTPATIENT
Start: 2024-03-28 | End: 2024-03-28 | Stop reason: HOSPADM

## 2024-03-28 RX ORDER — ROCURONIUM BROMIDE 10 MG/ML
INJECTION, SOLUTION INTRAVENOUS AS NEEDED
Status: DISCONTINUED | OUTPATIENT
Start: 2024-03-28 | End: 2024-03-28

## 2024-03-28 RX ORDER — ACETAMINOPHEN 325 MG/1
975 TABLET ORAL ONCE
Status: COMPLETED | OUTPATIENT
Start: 2024-03-28 | End: 2024-03-28

## 2024-03-28 RX ORDER — MIDAZOLAM HYDROCHLORIDE 1 MG/ML
INJECTION, SOLUTION INTRAMUSCULAR; INTRAVENOUS AS NEEDED
Status: DISCONTINUED | OUTPATIENT
Start: 2024-03-28 | End: 2024-03-28

## 2024-03-28 RX ORDER — POLYETHYLENE GLYCOL 3350 17 G/17G
17 POWDER, FOR SOLUTION ORAL DAILY
Qty: 30 PACKET | Refills: 0 | Status: SHIPPED | OUTPATIENT
Start: 2024-03-28 | End: 2024-04-27

## 2024-03-28 RX ORDER — SCOLOPAMINE TRANSDERMAL SYSTEM 1 MG/1
1 PATCH, EXTENDED RELEASE TRANSDERMAL
Status: DISCONTINUED | OUTPATIENT
Start: 2024-03-28 | End: 2024-03-28 | Stop reason: HOSPADM

## 2024-03-28 RX ORDER — ACETAMINOPHEN 325 MG/1
650 TABLET ORAL EVERY 6 HOURS PRN
Qty: 30 TABLET | Refills: 0 | Status: SHIPPED | OUTPATIENT
Start: 2024-03-28 | End: 2024-04-27

## 2024-03-28 RX ORDER — ACETAMINOPHEN 325 MG/1
650 TABLET ORAL EVERY 6 HOURS PRN
Qty: 30 TABLET | Refills: 0 | Status: SHIPPED | OUTPATIENT
Start: 2024-03-28 | End: 2024-03-28 | Stop reason: SDUPTHER

## 2024-03-28 RX ADMIN — GABAPENTIN 600 MG: 300 CAPSULE ORAL at 07:29

## 2024-03-28 RX ADMIN — ONDANSETRON 4 MG: 2 INJECTION, SOLUTION INTRAMUSCULAR; INTRAVENOUS at 08:28

## 2024-03-28 RX ADMIN — CELECOXIB 400 MG: 100 CAPSULE ORAL at 07:29

## 2024-03-28 RX ADMIN — METRONIDAZOLE 500 MG: 500 INJECTION, SOLUTION INTRAVENOUS at 07:32

## 2024-03-28 RX ADMIN — PROPOFOL 200 MG: 10 INJECTION, EMULSION INTRAVENOUS at 08:28

## 2024-03-28 RX ADMIN — DEXAMETHASONE SODIUM PHOSPHATE 8 MG: 4 INJECTION, SOLUTION INTRAMUSCULAR; INTRAVENOUS at 08:28

## 2024-03-28 RX ADMIN — SCOPOLAMINE 1 PATCH: 1.5 PATCH, EXTENDED RELEASE TRANSDERMAL at 07:45

## 2024-03-28 RX ADMIN — GENTAMICIN SULFATE 132 MG: 40 INJECTION, SOLUTION INTRAMUSCULAR; INTRAVENOUS at 08:35

## 2024-03-28 RX ADMIN — LIDOCAINE HYDROCHLORIDE 50 MG: 20 INJECTION, SOLUTION EPIDURAL; INFILTRATION; INTRACAUDAL; PERINEURAL at 08:28

## 2024-03-28 RX ADMIN — ROCURONIUM BROMIDE 50 MG: 10 INJECTION, SOLUTION INTRAVENOUS at 08:28

## 2024-03-28 RX ADMIN — SUGAMMADEX 200 MG: 100 INJECTION, SOLUTION INTRAVENOUS at 09:28

## 2024-03-28 RX ADMIN — ACETAMINOPHEN 975 MG: 325 TABLET ORAL at 07:28

## 2024-03-28 RX ADMIN — CEFAZOLIN SODIUM 2 G: 1 INJECTION, SOLUTION INTRAVENOUS at 08:25

## 2024-03-28 RX ADMIN — MIDAZOLAM 2 MG: 1 INJECTION INTRAMUSCULAR; INTRAVENOUS at 08:20

## 2024-03-28 RX ADMIN — FENTANYL CITRATE 100 MCG: 50 INJECTION, SOLUTION INTRAMUSCULAR; INTRAVENOUS at 08:28

## 2024-03-28 RX ADMIN — SUGAMMADEX 200 MG: 100 INJECTION, SOLUTION INTRAVENOUS at 09:50

## 2024-03-28 RX ADMIN — SODIUM CHLORIDE 100 ML/HR: 9 INJECTION, SOLUTION INTRAVENOUS at 07:31

## 2024-03-28 RX ADMIN — SCOLOPAMINE TRANSDERMAL SYSTEM 1 PATCH: 1 PATCH, EXTENDED RELEASE TRANSDERMAL at 07:45

## 2024-03-28 SDOH — HEALTH STABILITY: MENTAL HEALTH: CURRENT SMOKER: 0

## 2024-03-28 ASSESSMENT — COLUMBIA-SUICIDE SEVERITY RATING SCALE - C-SSRS
1. IN THE PAST MONTH, HAVE YOU WISHED YOU WERE DEAD OR WISHED YOU COULD GO TO SLEEP AND NOT WAKE UP?: NO
6. HAVE YOU EVER DONE ANYTHING, STARTED TO DO ANYTHING, OR PREPARED TO DO ANYTHING TO END YOUR LIFE?: NO
2. HAVE YOU ACTUALLY HAD ANY THOUGHTS OF KILLING YOURSELF?: NO

## 2024-03-28 ASSESSMENT — PAIN - FUNCTIONAL ASSESSMENT
PAIN_FUNCTIONAL_ASSESSMENT: 0-10
PAIN_FUNCTIONAL_ASSESSMENT: 0-10
PAIN_FUNCTIONAL_ASSESSMENT: UNABLE TO SELF-REPORT
PAIN_FUNCTIONAL_ASSESSMENT: 0-10

## 2024-03-28 ASSESSMENT — PAIN SCALES - GENERAL
PAINLEVEL_OUTOF10: 1
PAIN_LEVEL: 0
PAINLEVEL_OUTOF10: 0 - NO PAIN
PAINLEVEL_OUTOF10: 2

## 2024-03-28 NOTE — ANESTHESIA PROCEDURE NOTES
Airway  Date/Time: 3/28/2024 8:30 AM  Urgency: elective    Airway not difficult    Staffing  Performed: CRNA   Authorized by: Adriane Mckenna MD    Performed by: ARLEN Vanessa-KAROLINA, DNP  Patient location during procedure: OR    Indications and Patient Condition  Indications for airway management: anesthesia  Spontaneous Ventilation: absent  Sedation level: deep  Preoxygenated: yes  Patient position: sniffing  MILS maintained throughout  Mask difficulty assessment: 1 - vent by mask  Planned trial extubation    Final Airway Details  Final airway type: endotracheal airway      Successful airway: ETT  Cuffed: yes   Successful intubation technique: direct laryngoscopy  Endotracheal tube insertion site: oral  Blade: Amor  Blade size: #4  ETT size (mm): 7.5  Cormack-Lehane Classification: grade IIa - partial view of glottis  Placement verified by: chest auscultation and capnometry   Cuff volume (mL): 10  Measured from: lips  ETT to lips (cm): 20  Number of attempts at approach: 1  Ventilation between attempts: none  Number of other approaches attempted: 0

## 2024-03-28 NOTE — ANESTHESIA PREPROCEDURE EVALUATION
"Patient: Blade Wang \"Diego\"    Procedure Information       Date/Time: 03/28/24 0830    Procedure: VAGINECTOMY (Vagina ) - Vaginectomy    Location: [facility] 02 / Virtual [facility]    Surgeons: Jorge A Quinteros MD            Relevant Problems   Anesthesia (within normal limits)       Clinical information reviewed:    Allergies  Meds               NPO Detail:  NPO/Void Status  Carbohydrate Drink Given Prior to Surgery? : N  Date of Last Liquid: 03/27/24  Time of Last Liquid: 2200  Date of Last Solid: 03/27/24  Time of Last Solid: 1900         Physical Exam    Airway  Mallampati: III  TM distance: >3 FB  Neck ROM: full     Cardiovascular - normal exam     Dental - normal exam     Pulmonary - normal exam     Abdominal - normal exam             Anesthesia Plan    History of general anesthesia?: yes  History of complications of general anesthesia?: no    ASA 2     general     The patient is not a current smoker.    intravenous induction   Postoperative administration of opioids is intended.  Anesthetic plan and risks discussed with patient.  Use of blood products discussed with patient who consented to blood products.    Plan discussed with CRNA and CAA.      "

## 2024-03-28 NOTE — H&P
History Of Present Illness  Diego Wang is a 34 y.o. female with a hx of penile inversion vaginoplasty with recurrent pyocolpos and vaginal stenosis presenting for vaginectomy.    Pmh: GERD  Psh: vaginoplasty  Meds: no blood thinners  NKDA  SHX: former smoker     Review of Systems  12 pt ROS reviewed and negative     Physical Exam  NAD  Non labored on RA  RRR  Soft NT ND  Voids freely     Last Recorded Vitals  There were no vitals taken for this visit.    Relevant Results  Cr 0.68  HH 13.7/41.8    Assessment/Plan   Principal Problem:    Gender dysphoria  Active Problems:    Pyocolpos    Vaginal stenosis    Diego Wang is a 34 y.o. female with a hx of penile inversion vaginoplasty with recurrent pyocolpos and vaginal stenosis presenting for vaginectomy. Proceed with surgery.        Sammy Livingston MD

## 2024-03-28 NOTE — OP NOTE
"VAGINECTOMY Operative Note     Date: 3/28/2024  OR Location: [facility] OR    Name: Blade Wang \"Diego\", : 1990, Age: 34 y.o., MRN: [id number], Sex: adult    Diagnosis  Pre-op Diagnosis     * Pyocolpos [N76.0]     * Vaginal stenosis [N89.5] Post-op Diagnosis     * Pyocolpos [N76.0]     * Vaginal stenosis [N89.5]     Procedures  VAGINECTOMY  97782 - MD VAGINECTOMY COMPLETE REMOVAL VAGINAL WALL    MD ADJNT TIS TRNSFR/REARGMT ANY AREA 30.1-60 SQ CM [07224]  MD ADJT TIS TRNSFR/REARGMT DEFEC EA ADDL 30 SQCM [69466]  MD MUSC MYOCUTANEOUS/FASCIOCUTANEOUS FLAP TRUNK [15749]  Surgeons      * Jorge A Quinteros - Primary    Resident/Fellow/Other Assistant:  Surgeon(s) and Role:     * Sammy Casanova MD - Resident - Assisting    Procedure Summary  Anesthesia: General  ASA: II  Anesthesia Staff: Anesthesiologist: Adriane Mckenna MD  CRNA: ARLEN Vanessa-KAROLINA, DNP  Estimated Blood Loss: 10 mL  Intra-op Medications:   Administrations occurring from 0830 to 1100 on 24:   Medication Name Total Dose   lidocaine-epinephrine (Xylocaine W/EPI) 1 %-1:100,000 injection 7 mL              Anesthesia Record               Intraprocedure I/O Totals          Intake    ceFAZolin in dextrose (iso-os) (Ancef) IVPB 1 g 100.00 mL    Total Intake 100 mL       Output    Urine 0 mL    Est. Blood Loss 25 mL    NG/OG Tube Output 100 mL    Other 0 mL    Total Output 125 mL       Net    Net Volume -25 mL          Specimen:   ID Type Source Tests Collected by Time   1 : Vaginal Mucosa Tissue VAGINAL MUCOSA SURGICAL PATHOLOGY EXAM Jorge A Quinteros MD 3/28/2024 0859        Staff:   Circulator: Alondra Saavedra RN  Scrub Person: Cristina Encinas         Drains and/or Catheters:   Open Drain 1 Groin (Active)       [REMOVED] Urethral Catheter 16 Fr. (Removed)       Tourniquet Times:         Implants:     Findings: Stenosed vaginal canal with pockets of fluid collection.  Adequate vaginectomy and vaginal ablation performed.    Indications: Diego Wang is " an 34 y.o. adult who is having surgery for Pyocolpos [N76.0]  Vaginal stenosis [N89.5].  She had a penile inversion vaginoplasty done at an outside facility a few years ago.  At that point in time, she had initially expressed an interest in 0 depth vaginoplasty, but eventually she was chosen to go a canal procedure.  Subsequently, she has had multiple issues with Puracol Plus, noninfectious fluid drainage, pain, as well as vaginal stenosis.  Currently she does not dilate and does not have any reasonable depth caliber.  However she continues to have drainage from the vaginal canal.  MRI confirmed fluid collection in the vaginal canal.  The patient requested a vaginectomy.  Alternate therapies such as vaginal canal revision were also discussed, but she was interested in removing the vaginal canal.  She understands that if you are not able to do it all vaginally, we may have to do a combined abdominal and perineal approach    The patient was seen in the preoperative area. The risks, benefits, complications, treatment options, non-operative alternatives, expected recovery and outcomes were discussed with the patient. The possibilities of reaction to medication, pulmonary aspiration, injury to surrounding structures, bleeding, recurrent infection, the need for additional procedures, failure to diagnose a condition, and creating a complication requiring transfusion or operation were discussed with the patient. The patient concurred with the proposed plan, giving informed consent.  The site of surgery was properly noted/marked if necessary per policy. The patient has been actively warmed in preoperative area. Preoperative antibiotics have been ordered and given within 1 hours of incision. Venous thrombosis prophylaxis have been ordered including bilateral sequential compression devices    Procedure Details: Patient was brought to the operating suite and laid supine on the table.  Huddle was performed.  She was anesthetized  and orotracheally intubated.  She was placed in dorsolithotomy position and prepped and draped in standard sterile fashion.  Another timeout was taken.  We started with the vaginal canal examination.  There was noted to be a divot a few centimeters in from the introitus with the canal seemingly closed.  However with gentle dilation we were able to get into the proximal canal.  Overall, the canal was lined with skin and the distal half, and the proximal 25%.  Middle part of the vaginal canal was denuded.  We copiously area irrigated out the canal with Irrisept.  We then injected lidocaine with epinephrine, and then proceeded to excise the entirety of the skin that was lining the canal.  This was done in a piecemeal fashion with the use of electrocautery and sharp dissection.  It was a very apex of the canal, we use electrocautery to ablate this area.  Multiple rounds of irrigation and ablation were done.  We were finally happy with the end result.  I placed a drain in this area and brought it out through a separate stab incision.  The vaginal canal was then closed with 2-0 PDS starting from the deep and proceeding distally.  On the external genitalia, there was a half-moon skin defect corresponding to where we had remove the vaginal canal externally.  Complex tissue closure with adjacent tissue transfer was done to bring it together in a transverse fashion to reduce the tension, and also improve aesthetics.  This was done with dermal closure of 3-0 Monocryl and skin closure with the mattress sutures of Monocryl.  A catheter has been placed at the outset of the case which was removed.  Dressings were applied the patient was repositioned awakened and transferred to the PACU in a stable condition  Complications:  None; patient tolerated the procedure well.    Disposition: PACU - hemodynamically stable.  Condition: stable         Additional Details: She will go home with a drain.  She will see us back in about a week for  drain removal.    Attending Attestation: I was present and scrubbed for the entire procedure.    Jorge A Quinteros  Phone Number: [phone]

## 2024-03-28 NOTE — DISCHARGE INSTRUCTIONS
Home Going Instructions After Surgery     Diet: You may resume your normal diet as tolerated.     Alcohol:  Alcoholic beverages, in moderation, are acceptable, but should be avoided while using prescription pain medication.      Activity:  Walking is encouraged, and climbing stairs is acceptable, but strenuous activity (e.g., lifting objects weighing over 10 lbs, sit-ups, pull-ups) should be avoided for 6-8 weeks     Driving:  Do not drive a vehicle while taking prescription pain medications. When you return to driving, do not go alone the first time. Do not drive if you are having pain.    Wound care:  You may shower tomorrow. Allow soap and water to run over the incision. Do not scrub the incision. Afterwards gently pat the wound dry. Do not swim or get in a tub of water for at least 2 weeks.    Medications:  You may take tylenol instead of the prescription pain medication for mild pain.   Resume taking your prior medications.   You will be prescribed 5 days of antibiotics to take.    Potential problems:  Surgical infection:   Fever higher than 100.4, especially if associated with an ill feeling, abdominal pain, chills, or nausea should be reported to your surgeon.   Excessive swelling, redness, drainage, or severe pain around the incision should be reported to your surgeon.    Office visits:  Please make a follow up visit with Dr. Quinteros  In 4 weeks. Please call the office to make an appointment.

## 2024-03-28 NOTE — ANESTHESIA POSTPROCEDURE EVALUATION
"Patient: Blade Wang \"Diego\"    Procedure Summary       Date: 03/28/24 Room / Location: [facility] 02 / [facility]    Anesthesia Start: 0813 Anesthesia Stop:     Procedure: VAGINECTOMY (Vagina ) Diagnosis:       Pyocolpos      Vaginal stenosis      (Pyocolpos [N76.0])      (Vaginal stenosis [N89.5])    Surgeons: Jorge A Quinteros MD Responsible Provider: Adriane Mckenna MD    Anesthesia Type: general ASA Status: 2            Anesthesia Type: general    Vitals Value Taken Time   /58 03/28/24 1005   Temp 36 03/28/24 1008   Pulse 78 03/28/24 1007   Resp 16 03/28/24 1008   SpO2 100 % 03/28/24 1007   Vitals shown include unvalidated device data.    Anesthesia Post Evaluation    Patient location during evaluation: PACU  Patient participation: complete - patient participated  Level of consciousness: sleepy but conscious  Pain score: 0  Pain management: adequate  Airway patency: patent  Cardiovascular status: acceptable and stable  Respiratory status: acceptable and face mask  Hydration status: acceptable  Postoperative Nausea and Vomiting: none        There were no known notable events for this encounter.    "

## 2024-03-29 RX ORDER — CHLORHEXIDINE GLUCONATE 40 MG/ML
SOLUTION TOPICAL SEE ADMIN INSTRUCTIONS
Qty: 118 ML | Refills: 0 | Status: SHIPPED | OUTPATIENT
Start: 2024-03-29

## 2024-03-29 NOTE — SIGNIFICANT EVENT
Patient called PACU with concerns about starkey draining properly, states she feels full and has an urge to urinate. Discussed proper starkey care, keeping drainage bag dependent. Secure chat sent to Dr. Quinteros to contact patient. Patient instructed to go to ER if she doesn't hear from Dr. Quinteros's office.     Dr. Quinteros's office staff Willy replied to secure chat that she spoke with the patient and advised her on what to do.

## 2024-03-30 ENCOUNTER — HOSPITAL ENCOUNTER (EMERGENCY)
Facility: HOSPITAL | Age: 34
Discharge: HOME | End: 2024-03-30
Attending: STUDENT IN AN ORGANIZED HEALTH CARE EDUCATION/TRAINING PROGRAM
Payer: COMMERCIAL

## 2024-03-30 VITALS
DIASTOLIC BLOOD PRESSURE: 56 MMHG | WEIGHT: 95 LBS | OXYGEN SATURATION: 100 % | RESPIRATION RATE: 16 BRPM | BODY MASS INDEX: 14.91 KG/M2 | HEIGHT: 67 IN | HEART RATE: 75 BPM | SYSTOLIC BLOOD PRESSURE: 98 MMHG | TEMPERATURE: 98.4 F

## 2024-03-30 DIAGNOSIS — T83.9XXA PROBLEM WITH FOLEY CATHETER, INITIAL ENCOUNTER (CMS-HCC): Primary | ICD-10-CM

## 2024-03-30 LAB
APPEARANCE UR: ABNORMAL
BILIRUB UR STRIP.AUTO-MCNC: NEGATIVE MG/DL
COLOR UR: YELLOW
GLUCOSE UR STRIP.AUTO-MCNC: NEGATIVE MG/DL
KETONES UR STRIP.AUTO-MCNC: NEGATIVE MG/DL
LEUKOCYTE ESTERASE UR QL STRIP.AUTO: ABNORMAL
MUCOUS THREADS #/AREA URNS AUTO: ABNORMAL /LPF
NITRITE UR QL STRIP.AUTO: NEGATIVE
PH UR STRIP.AUTO: 6 [PH]
PROT UR STRIP.AUTO-MCNC: ABNORMAL MG/DL
RBC # UR STRIP.AUTO: ABNORMAL /UL
RBC #/AREA URNS AUTO: >20 /HPF
SP GR UR STRIP.AUTO: 1
UROBILINOGEN UR STRIP.AUTO-MCNC: <2 MG/DL
WBC #/AREA URNS AUTO: ABNORMAL /HPF

## 2024-03-30 PROCEDURE — 99283 EMERGENCY DEPT VISIT LOW MDM: CPT

## 2024-03-30 PROCEDURE — 81001 URINALYSIS AUTO W/SCOPE: CPT | Performed by: STUDENT IN AN ORGANIZED HEALTH CARE EDUCATION/TRAINING PROGRAM

## 2024-03-30 RX ORDER — PHENAZOPYRIDINE HYDROCHLORIDE 200 MG/1
200 TABLET, FILM COATED ORAL 3 TIMES DAILY
Qty: 9 TABLET | Refills: 0 | Status: SHIPPED | OUTPATIENT
Start: 2024-03-30 | End: 2024-03-30 | Stop reason: SDUPTHER

## 2024-03-30 RX ORDER — OXYBUTYNIN CHLORIDE 5 MG/1
5 TABLET ORAL 3 TIMES DAILY PRN
Qty: 6 TABLET | Refills: 0 | Status: SHIPPED | OUTPATIENT
Start: 2024-03-30 | End: 2024-04-01

## 2024-03-30 RX ORDER — OXYBUTYNIN CHLORIDE 5 MG/1
5 TABLET ORAL 3 TIMES DAILY PRN
Qty: 6 TABLET | Refills: 0 | Status: SHIPPED | OUTPATIENT
Start: 2024-03-30 | End: 2024-03-30 | Stop reason: SDUPTHER

## 2024-03-30 RX ORDER — PHENAZOPYRIDINE HYDROCHLORIDE 200 MG/1
200 TABLET, FILM COATED ORAL 3 TIMES DAILY
Qty: 9 TABLET | Refills: 0 | Status: SHIPPED | OUTPATIENT
Start: 2024-03-30 | End: 2024-04-02

## 2024-03-30 RX ORDER — AMOXICILLIN AND CLAVULANATE POTASSIUM 875; 125 MG/1; MG/1
1 TABLET, FILM COATED ORAL EVERY 12 HOURS
Qty: 14 TABLET | Refills: 0 | Status: SHIPPED | OUTPATIENT
Start: 2024-03-30 | End: 2024-03-30 | Stop reason: ALTCHOICE

## 2024-03-30 ASSESSMENT — COLUMBIA-SUICIDE SEVERITY RATING SCALE - C-SSRS
1. IN THE PAST MONTH, HAVE YOU WISHED YOU WERE DEAD OR WISHED YOU COULD GO TO SLEEP AND NOT WAKE UP?: NO
2. HAVE YOU ACTUALLY HAD ANY THOUGHTS OF KILLING YOURSELF?: NO
6. HAVE YOU EVER DONE ANYTHING, STARTED TO DO ANYTHING, OR PREPARED TO DO ANYTHING TO END YOUR LIFE?: NO

## 2024-03-30 ASSESSMENT — PAIN SCALES - GENERAL: PAINLEVEL_OUTOF10: 8

## 2024-03-30 ASSESSMENT — PAIN - FUNCTIONAL ASSESSMENT: PAIN_FUNCTIONAL_ASSESSMENT: 0-10

## 2024-03-30 ASSESSMENT — PAIN DESCRIPTION - PAIN TYPE: TYPE: ACUTE PAIN;SURGICAL PAIN

## 2024-03-30 ASSESSMENT — PAIN DESCRIPTION - LOCATION: LOCATION: VAGINA

## 2024-03-30 NOTE — DISCHARGE INSTRUCTIONS
You came to the emergency department for difficulties with your Vela catheter.  You are being prescribed medication to help with this discomfort.  Take the Pyridium and Ditropan as prescribed.  Do not take the Ditropan on the day you are supposed to get your Vela catheter removed which is currently Monday, April 1.  Please follow-up with your surgeon as scheduled.

## 2024-03-30 NOTE — ED PROVIDER NOTES
HPI   Chief Complaint   Patient presents with    Post-op Problem     Pt c/o post op pain at site and urinary catheter.  Patient had vagectomy Thursday.       This is a 34-year-old MTF trans with history of vaginoplasty now 2 days post vaginectomy presenting the emergency department with concerns for problems with her Vela catheter.  Patient states since the surgery she has had discomfort with some burning from her Vela.  She is concerned that it is not draining properly.  She still getting urine into the bag but believes it is not as much as it was when it was initially placed.  Patient otherwise endorses symptoms that she was told to expect from the surgery including mild abdominal pain.  She denies any fever/chills, nauseous vomiting, chest pain, shortness of breath, lower extremity pain or swelling.      History provided by:  Patient   used: No                        Aris Coma Scale Score: 15                     Patient History   No past medical history on file.  Past Surgical History:   Procedure Laterality Date    OTHER SURGICAL HISTORY      gender surgery     No family history on file.  Social History     Tobacco Use    Smoking status: Never    Smokeless tobacco: Never   Vaping Use    Vaping Use: Never used   Substance Use Topics    Alcohol use: Not on file    Drug use: Never       Physical Exam   ED Triage Vitals [03/30/24 0658]   Temperature Heart Rate Respirations BP   36.9 °C (98.4 °F) (!) 111 18 110/65      Pulse Ox Temp Source Heart Rate Source Patient Position   98 % Temporal Monitor Standing      BP Location FiO2 (%)     -- --       Physical Exam  GEN: well appearing, no acute distress  CVS/CHEST: Tachycardic rate, nl rhythm, no murmurs/gallops/rubs  PULM: CTAB b/l no wheezes, crackles, or rhonchi   GI/: Mild suprapubic tenderness to palpation without guarding, Vela catheter with dark urine in bag and clear urine in tubing  BACK: no CVA tenderness  NEURO:  no focal deficits,  no facial asymmetry, moving all extremities  PSYCH: AAOx3 answers questions appropriately  ED Course & [facility]   Diagnoses as of 03/31/24 0945   Problem with Vela catheter, initial encounter (CMS/[facility])       Medical Decision Making  This is a 34-year-old MTF trans with history of vaginoplasty now 2 days post vaginectomy presenting the emergency department with concerns for problems with her Vela catheter.  Patient stable upon presentation to the emergency department, no acute distress.  Vitals significant for mild tachycardia but otherwise largely unremarkable.  On exam patient's Vela catheter does appear to be draining with clear urine in the tubing and dark urine in the bag.  Will evaluate for potential UTI.  She does have some suprapubic tenderness though this is likely normal postop pain endorses this is improving since the surgery.  She has no CVA tenderness.  I did discuss patient with her surgeon Dr. Quinteros given the recent surgery.    Patient's urinalysis did show potential infection.  Patient had just been started on Bactrim for prophylactic purposes postsurgery.  No indication for additional antibiotics at this time.  Dr. Quinteros recommended patient be started on Pyridium and Ditropan which will be prescribed.  Catheter not to be removed at this time.  Patient instructed not to take the Ditropan the day of her catheter removal which is in 2 days.  Bladder scan had shown less than 50 cc in her bladder and catheter appears to be draining appropriately.  Patient's tachycardia improved in the emergency department.  At this time she feels comfortable going home I do feel that she is safe for discharge.  Return precautions discussed and patient discharged stable condition.  She has follow-up this coming week.    Procedure  Procedures     Skinny Levi MD  03/31/24 1032

## 2024-04-01 ENCOUNTER — HOSPITAL ENCOUNTER (EMERGENCY)
Age: 34
Discharge: HOME | End: 2024-04-01
Payer: COMMERCIAL

## 2024-04-01 ENCOUNTER — OFFICE VISIT (OUTPATIENT)
Dept: UROLOGY | Facility: CLINIC | Age: 34
End: 2024-04-01
Payer: COMMERCIAL

## 2024-04-01 VITALS
OXYGEN SATURATION: 99 % | SYSTOLIC BLOOD PRESSURE: 110 MMHG | HEART RATE: 95 BPM | DIASTOLIC BLOOD PRESSURE: 73 MMHG | TEMPERATURE: 98.2 F | RESPIRATION RATE: 19 BRPM

## 2024-04-01 VITALS
HEART RATE: 144 BPM | DIASTOLIC BLOOD PRESSURE: 77 MMHG | BODY MASS INDEX: 14.79 KG/M2 | SYSTOLIC BLOOD PRESSURE: 116 MMHG | WEIGHT: 94.2 LBS | HEIGHT: 67 IN

## 2024-04-01 DIAGNOSIS — N76.0 PYOCOLPOS: Primary | ICD-10-CM

## 2024-04-01 DIAGNOSIS — R33.9 RETENTION OF URINE: Primary | ICD-10-CM

## 2024-04-01 PROCEDURE — 51798 US URINE CAPACITY MEASURE: CPT

## 2024-04-01 PROCEDURE — 99024 POSTOP FOLLOW-UP VISIT: CPT | Performed by: UROLOGY

## 2024-04-01 PROCEDURE — 99283 EMERGENCY DEPT VISIT LOW MDM: CPT

## 2024-04-01 RX ORDER — PHENAZOPYRIDINE HYDROCHLORIDE 100 MG/1
200 TABLET, FILM COATED ORAL ONCE
Status: DISCONTINUED | OUTPATIENT
Start: 2024-04-01 | End: 2024-04-01

## 2024-04-01 RX ORDER — ONDANSETRON 4 MG/1
4 TABLET, ORALLY DISINTEGRATING ORAL 3 TIMES DAILY PRN
Qty: 21 TABLET | Refills: 0 | Status: SHIPPED | OUTPATIENT
Start: 2024-04-01

## 2024-04-01 NOTE — PROGRESS NOTES
S.p vaginectomy for pyocolpos last Thursday  Doing well  Had issues with cath not draining- went to ER> it was positional  Driving back to [facility] today    Looks well    Cath removed  Drain removed  Inc healthy    Fu 4 weeks virtual

## 2024-04-01 NOTE — ED PROVIDER NOTES
[facility]  ED  Emergency Department Encounter    Patient Name: Catarino Fernandes  MRN: [id number]  YOB: 1990  Date of Evaluation: 4/1/2024  Provider: System, Referring Not In (Inactive)  Note Started: 6:34 PM EDT 4/1/24    CHIEF COMPLAINT  Urinary Retention (Urinary Catheter was removed around 830am today s/p vaginectomy.)    SHARED SERVICE VISIT  Evaluated by STEFANIE.  My supervising physician was available for consultation.     HISTORY OF PRESENT ILLNESS  Catarino Fernandes is a 34 y.o. female who presents to the ED for eval ration of urinary retention.  Patient brought in by squad for evaluation.  Patient states that she had her catheter removed around 830 this morning and has been unable to urinate since.  Reports catheter was placed last Thursday following urinary retention status post vaginectomy.  Reports that this was secondary to chronic bacterial vaginosis.  She does have a history of gender reassignment surgery male to female.  Reports nausea without vomiting.  No diarrhea or constipation.  No back or flank pain.  No headaches or dizziness.  No chest pain or shortness of breath.    No other complaints, modifying factors or associated symptoms.     Nursing notes reviewed were all reviewed and agreed with or any disagreements were addressed in the HPI.    PMH:  Past Medical History:   Diagnosis Date    Anxiety     Borderline personality disorder (HCC)     Gender identity disorder of adulthood, status post gender reassignment surgery 07/2016    Male to Female    MDD (major depressive disorder), recurrent severe, without psychosis (HCC)      Surgical History:  Past Surgical History:   Procedure Laterality Date    SEX TRANSFORMATION SURGERY, MALE TO FEMALE  07/2016     Family History:  No family history on file.    Social History:  Social History     Socioeconomic History    Marital status:      Spouse name: Not on file    Number of children: 0    Years of education: 12

## 2024-04-10 LAB
LABORATORY COMMENT REPORT: NORMAL
PATH REPORT.FINAL DX SPEC: NORMAL
PATH REPORT.GROSS SPEC: NORMAL
PATH REPORT.RELEVANT HX SPEC: NORMAL
PATH REPORT.TOTAL CANCER: NORMAL
RESIDENT REVIEW: NORMAL

## 2024-04-29 ENCOUNTER — TELEMEDICINE (OUTPATIENT)
Dept: UROLOGY | Facility: CLINIC | Age: 34
End: 2024-04-29
Payer: COMMERCIAL

## 2024-04-29 DIAGNOSIS — F64.9 GENDER DYSPHORIA: Primary | ICD-10-CM

## 2024-04-29 PROCEDURE — 99024 POSTOP FOLLOW-UP VISIT: CPT | Performed by: UROLOGY

## 2024-04-29 NOTE — PROGRESS NOTES
34-year-old trans feminine patient who had a penile inversion vaginoplasty done in [place] few years ago.  The vaginal canal was complicated by stenosis, infection, Puracol Plus, and pain.  She requested a vaginectomy.  A vaginectomy was performed about a month ago.    This is a follow-up visit to check on her progress.  She has been doing well.  She reports that there is some swelling in the area of the perineum where the closure is but apart from that she is very happy with the outcome.  No drainage, no pain, no pressure.  She is voiding well.  She is super thrilled with the outcome.    I looked at the images that are in the patient's media tab.  She appears to have normal postoperative healing with some potentially some soft tissue edema.  I advised her that this should resolve in the next 3 months or so.  I congratulated her on her short-term success  Will see her on an as-needed basis

## (undated) DEVICE — CORD, CAUTERY, BIOPOLAR FORCEP, 12FT

## (undated) DEVICE — 7 INCHES X 11 INCHES (18CM X 30CM) STERI-DRAPE INSTRUMENT POUCH, CLEAR PLASTIC, 2 COMPARTMENTS, 2 ADHESIVE STRIPS

## (undated) DEVICE — SYRINGE, 60 CC, IRRIGATION, BULB, CONTRO-BULB, PAPER POUCH

## (undated) DEVICE — CATHETER, IV, ANGIOCATH, 16 G X 1.88 IN, FEP POLYMER

## (undated) DEVICE — CATHETER TRAY, SURESTEP, 16FR, URINE METER W/STATLOCK

## (undated) DEVICE — DRESSING, QUICKCLOT, HEMOSTATIC, 5 X 5

## (undated) DEVICE — Device

## (undated) DEVICE — LUBRICANT, SURGILUBE, STERILE, 2OZ

## (undated) DEVICE — DRAPE, SHEET, UTILITY, NON ABSORBENT, 18 X 26 IN, LF

## (undated) DEVICE — RESERVOIR, DRAINAGE, WOUND, JACKSON-PRATT, 100 CC, SILICONE

## (undated) DEVICE — SLEEVE, VASO PRESS, CALF GARMENT, MEDIUM, GREEN

## (undated) DEVICE — DRAIN, CHANNEL, ROUND, FULL FLUTE 19F

## (undated) DEVICE — WOUND SYSTEM, DEBRIDEMENT & CLEANING, O.R DUOPAK

## (undated) DEVICE — STAY SET, SURGICAL, 5MM SHARP HOOK, 8PK

## (undated) DEVICE — ELECTRODE, ELECTROSURGICAL, BLADE, INSULATED, ENT/IMA, STERILE

## (undated) DEVICE — DRAPE, UNDERBUTTOCKS

## (undated) DEVICE — CORD, BIPOLAR,  12 FT, DISPOSABLE, LF

## (undated) DEVICE — APPLICATOR, CHLORAPREP, W/ORANGE TINT, 26ML

## (undated) DEVICE — ELECTRODE, ELECTROSURGICAL, BLADE, 2.75 IN, TEFLON

## (undated) DEVICE — DRAPE, LEGGINGS, 48 X 31 IN, STERILE, LF

## (undated) DEVICE — RETRACTOR, CORDLESS, RADIALUX, LIGHTED

## (undated) DEVICE — ELECTRODE, ELECTROSURGICAL, BALL TIP, LLETZ, LARGE, 5 MM X 13 CM, STAINLESS STEEL